# Patient Record
Sex: FEMALE | Race: WHITE | Employment: FULL TIME | URBAN - METROPOLITAN AREA
[De-identification: names, ages, dates, MRNs, and addresses within clinical notes are randomized per-mention and may not be internally consistent; named-entity substitution may affect disease eponyms.]

---

## 2021-01-04 ENCOUNTER — TELEPHONE (OUTPATIENT)
Dept: NEUROLOGY | Facility: CLINIC | Age: 41
End: 2021-01-04

## 2021-02-10 ENCOUNTER — OFFICE VISIT (OUTPATIENT)
Dept: NEUROLOGY | Facility: CLINIC | Age: 41
End: 2021-02-10
Payer: COMMERCIAL

## 2021-02-10 ENCOUNTER — TELEPHONE (OUTPATIENT)
Dept: NEUROLOGY | Facility: CLINIC | Age: 41
End: 2021-02-10

## 2021-02-10 VITALS
HEART RATE: 64 BPM | WEIGHT: 123 LBS | SYSTOLIC BLOOD PRESSURE: 120 MMHG | RESPIRATION RATE: 16 BRPM | DIASTOLIC BLOOD PRESSURE: 68 MMHG

## 2021-02-10 DIAGNOSIS — R25.1 TREMORS OF NERVOUS SYSTEM: Primary | ICD-10-CM

## 2021-02-10 PROCEDURE — 3078F DIAST BP <80 MM HG: CPT | Performed by: OTHER

## 2021-02-10 PROCEDURE — 99204 OFFICE O/P NEW MOD 45 MIN: CPT | Performed by: OTHER

## 2021-02-10 PROCEDURE — 3074F SYST BP LT 130 MM HG: CPT | Performed by: OTHER

## 2021-02-10 NOTE — H&P
Neurology H&P    Lawyer Tejeda Patient Status:  No patient class for patient encounter    2/15/1980 MRN [id number]   Location [facility], [address] [place] [address], [address] [place] [address] Attending No att. providers found   Hosp Day # 0 PCP No primary care provider use: Not Currently      Family History:  No family history on file. Mother with tremor    ROS:  10 point ROS completed and was negative, except for pertinent positive and negatives stated in subjective.     Objective/Physical Exam:    Vital Signs:  Blood these supplements contain any ingredients that would exacerbate her tremors. Plan:  1.  Tremors  - MRI brain  - Declines any medications  - Likely benign essential tremor      Joleen Sit, DO  Neurology

## 2024-10-29 ENCOUNTER — TELEPHONE (OUTPATIENT)
Facility: CLINIC | Age: 44
End: 2024-10-29

## 2024-10-29 NOTE — TELEPHONE ENCOUNTER
Patient has upcoming consult with Dr. Pace on 11/07. Records were received via fax. Records in gi bin at .

## 2024-11-07 ENCOUNTER — OFFICE VISIT (OUTPATIENT)
Facility: CLINIC | Age: 44
End: 2024-11-07
Payer: COMMERCIAL

## 2024-11-07 ENCOUNTER — LAB ENCOUNTER (OUTPATIENT)
Dept: LAB | Facility: HOSPITAL | Age: 44
End: 2024-11-07
Attending: INTERNAL MEDICINE
Payer: COMMERCIAL

## 2024-11-07 VITALS
DIASTOLIC BLOOD PRESSURE: 66 MMHG | SYSTOLIC BLOOD PRESSURE: 110 MMHG | HEIGHT: 65 IN | BODY MASS INDEX: 23.16 KG/M2 | HEART RATE: 74 BPM | WEIGHT: 139 LBS

## 2024-11-07 DIAGNOSIS — K59.04 CHRONIC IDIOPATHIC CONSTIPATION: ICD-10-CM

## 2024-11-07 DIAGNOSIS — R63.5 WEIGHT GAIN: Primary | ICD-10-CM

## 2024-11-07 DIAGNOSIS — R63.5 WEIGHT GAIN: ICD-10-CM

## 2024-11-07 DIAGNOSIS — R14.0 ABDOMINAL BLOATING: ICD-10-CM

## 2024-11-07 LAB — TSI SER-ACNC: 2.52 UIU/ML (ref 0.55–4.78)

## 2024-11-07 PROCEDURE — 3008F BODY MASS INDEX DOCD: CPT | Performed by: INTERNAL MEDICINE

## 2024-11-07 PROCEDURE — 3078F DIAST BP <80 MM HG: CPT | Performed by: INTERNAL MEDICINE

## 2024-11-07 PROCEDURE — 36415 COLL VENOUS BLD VENIPUNCTURE: CPT

## 2024-11-07 PROCEDURE — 84443 ASSAY THYROID STIM HORMONE: CPT

## 2024-11-07 PROCEDURE — 99204 OFFICE O/P NEW MOD 45 MIN: CPT | Performed by: INTERNAL MEDICINE

## 2024-11-07 PROCEDURE — 3074F SYST BP LT 130 MM HG: CPT | Performed by: INTERNAL MEDICINE

## 2024-11-07 RX ORDER — PLECANATIDE 3 MG/1
3 TABLET ORAL DAILY
COMMUNITY
Start: 2024-10-14

## 2024-11-07 RX ORDER — NORETHINDRONE 5 MG/1
5 TABLET ORAL DAILY
COMMUNITY
Start: 2024-07-15

## 2024-11-07 NOTE — H&P
[facility] - Gastroenterology                                                                                                               Reason for consult: constipation    Requesting physician or provider: No primary care provider on file.    Chief Complaint   Patient presents with    Other     Digestive problems    Constipation    Bloating       HPI:   Kadie Leyva is a 44 year old year-old female with history of tonsillectomy, who presents for constipation and bloating issues.    -dx in the past with fibromyalgia   -saw a rheumatologist but told no autoimmune disease  -PCP did a CT for bloating--->fibroid large  -Had fibroid removed in June 2024  -Endometriosis was noted also per patient  -But after surgery still had bloating issues despite fibroid surgery  -She still has constipation issues, bloating, distracting her from work  -Started Linzess prior to fibroid surgery, did not find benefit  -Now on trulance and finds it beneficial  -No family hx of CRC  -Sister with UC  -Patient denies any prior pregnancies or vaginal deliveries.  - Denies any straining, denies any stress urinary incontinence  - No diarrhea or blood in the stool  - No unusual weight loss  - Can go several days without a bowel movement  - Definitely feels better on Trulance    CT (April 2024)  IMPRESSION:    1.  Large uterine leiomyoma with mass effect, possible etiology for concern.   2.  No acute intra-abdominal abnormality       Prior endoscopies:  2018- [facility] - tortuous colon    Soc:  -no smoking  -social Etoh  -     Wt Readings from Last 6 Encounters:   11/07/24 139 lb (63 kg)   02/10/21 123 lb (55.8 kg)        History, Medications, Allergies, ROS:      Past Medical History:    BACK PAIN      Past Surgical History:   Procedure Laterality Date    Correct bunion,simple Left     Tonsillectomy        Family Hx: History reviewed. No  pertinent family history.   Social History:   Social History     Socioeconomic History    Marital status: Single   Tobacco Use    Smoking status: Never    Smokeless tobacco: Never   Vaping Use    Vaping status: Never Used   Substance and Sexual Activity    Alcohol use: Yes     Comment: occas.    Drug use: Not Currently   Other Topics Concern    Caffeine Concern Yes     Comment: rarely    Exercise Yes     Comment: little     Social Drivers of Health     Financial Resource Strain: Low Risk  (4/30/2024)    Received from [facility]    Overall Financial Resource Strain (CARDIA)     Difficulty of Paying Living Expenses: Not hard at all   Food Insecurity: No Food Insecurity (4/30/2024)    Received from [facility]    Hunger Vital Sign     Worried About Running Out of Food in the Last Year: Never true     Ran Out of Food in the Last Year: Never true   Transportation Needs: No Transportation Needs (4/30/2024)    Received from [facility]    PRAPARE - Transportation     Lack of Transportation (Medical): No     Lack of Transportation (Non-Medical): No   Physical Activity: Insufficiently Active (4/30/2024)    Received from [facility]    Exercise Vital Sign     Days of Exercise per Week: 4 days     Minutes of Exercise per Session: 20 min   Stress: No Stress Concern Present (4/30/2024)    Received from [facility]    [employer] - Occupational Stress Questionnaire     Feeling of Stress : Only a little   Social Connections: Moderately Integrated (4/30/2024)    Received from [facility]    Social Connection and Isolation Panel [NHANES]     Frequency of Communication with Friends and Family: Three times a week     Frequency of Social Gatherings with Friends and Family: Twice a week     Attends Buddhist Services: More than 4 times per year     Active Member of Clubs or Organizations: Yes      Attends Club or Organization Meetings: More than 4 times per year     Marital Status: Never    Housing Stability: Low Risk  (4/30/2024)    Received from [facility]    Housing Stability Vital Sign     Unable to Pay for Housing in the Last Year: No     Number of Times Moved in the Last Year: 0     Homeless in the Last Year: No        Medications (Active prior to today's visit):  Current Outpatient Medications   Medication Sig Dispense Refill    TRULANCE 3 MG Oral Tab Take 3 mg by mouth daily.      norethindrone 5 MG Oral Tab Take 1 tablet (5 mg total) by mouth daily.      polyethylene glycol, PEG 3350-KCl-NaBcb-NaCl-NaSulf, 236 g Oral Recon Soln Take 4,000 mL by mouth once for 1 dose. As directed by GI clinic instructions. 4000 mL 0    aspirin 325 MG Oral Tab TAKE 1 TAB BY MOUTH DAILY WITH BREAKFAST FOR 45 DAYS. BLOOD THINNING MED. START AM AFTER SURGERY.      Cholecalciferol 125 MCG (5000 UT) Oral Tab Take 5,000 Units by mouth daily.         Allergies:  Allergies[1]    ROS:   CONSTITUTIONAL:  negative for fevers, rigors  EYES:  negative for diplopia   RESPIRATORY:  negative for severe shortness of breath  CARDIOVASCULAR:  negative for crushing sub-sternal chest pain  GASTROINTESTINAL:  see HPI  GENITOURINARY:  negative for dysuria or gross hematuria  INTEGUMENT/BREAST:  SKIN:  negative for jaundice   ALLERGIC/IMMUNOLOGIC:  negative for hay fever  ENDOCRINE:  negative for cold intolerance and heat intolerance  MUSCULOSKELETAL:  negative for joint effusion/severe erythema  BEHAVIOR/PSYCH:  negative for psychotic behavior      PHYSICAL EXAM:   Blood pressure 110/66, pulse 74, height 5' 5\" (1.651 m), weight 139 lb (63 kg).    Gen- Patient appears comfortable and in no acute discomfort  HEENT: the sclera appears anicteric, oropharynx clear, mucus membranes appear moist  CV- regular rate and rhythm, the extremities are warm and well perfused   Lung- Moves air well; No labored breathing  Abdomen-  soft, non-tender exam in all quadrants without rigidity or guarding, non-distended, no abnormal bowel sounds noted, no masses are palpated  Skin- No jaundice  Ext: no cyanosis, clubbing or edema is evident.   Neuro- Alert and interactive, and gross movements of extremities normal  Psych - appropriate, non-agitated    Labs/Imaging:     Patient's pertinent labs and imaging were reviewed and discussed with patient today.      ASSESSMENT/PLAN:   Kadie Leyva is a 44 year old year-old female with history of tonsillectomy, who presents for constipation and bloating issues.    #Tortuous colon  #IBS-C  #Endometriosis  #Fibromyalgia    I had a long discussion with patient regarding her symptoms of chronic constipation for over 10 years.  She is known to have a tortuous colon on colonoscopy several years ago.  No red flag symptoms.  CT scan reviewed.  She is only partially better after large fibroid removal.  She is much improved on Trulance.  I did discuss with her that she probably has chronic constipation or IBS-C.  She will need to be on constipation medications lifelong most likely.  Will also check her thyroid testing to make sure she does not have hypothyroidism.  Washout periodically is discussed as well, risks and benefits addressed.    Recommendations:    Washout  Thyroid check  Continue trulance but can consider Linzess 290mcg/day  Squatty potty  ---------------    WASHOUT INSTRUCTIONS:  1. Pick a day you will be at home, close to a toilet.  2. Have a some juice for breakfast.   3. Around 8AM start drinking the solution prescribed (for trilyte, drink 1 cup every 15 minutes) over the course of 3-5 hours. IF having nausea/bloating, take a break for 30 minutes, walk around and then resume drinking. If vomiting, take a break for 1 hour, if symptoms improve, and you feel well, can resume drinking.  4. Goal is to finish solution or until stools turn liquid yellow.  5. For lunch you should have a liquid diet (7up,  water, gingerale, etc)  6. After prep, for dinner you can have a light dinner.  7. Resume regular meals the following day, along with laxative medications.  8. You should continue your regular medications during the washout day.         Orders This Visit:  Orders Placed This Encounter   Procedures    TSH W Reflex To Free T4       Meds This Visit:  Requested Prescriptions     Signed Prescriptions Disp Refills    polyethylene glycol, PEG 3350-KCl-NaBcb-NaCl-NaSulf, 236 g Oral Recon Soln 4000 mL 0     Sig: Take 4,000 mL by mouth once for 1 dose. As directed by GI clinic instructions.       Imaging & Referrals:  None         DAVID Pace MD  Pager: [phone]  11/7/2024        This note was partially prepared using Dragon Medical voice recognition dictation software. As a result, errors may occur. When identified, these errors have been corrected. While every attempt is made to correct errors during dictation, discrepancies may still exist.        [1]   Allergies  Allergen Reactions    Gluten Flour MYALGIA

## 2024-11-07 NOTE — PATIENT INSTRUCTIONS
Washout  Thyroid check  Continue trulance but can consider Linzess 290mcg/day  Squatty potty  ---------------    WASHOUT INSTRUCTIONS:  1. Pick a day you will be at home, close to a toilet.  2. Have a some juice for breakfast.   3. Around 8AM start drinking the solution prescribed (for trilyte, drink 1 cup every 15 minutes) over the course of 3-5 hours. IF having nausea/bloating, take a break for 30 minutes, walk around and then resume drinking. If vomiting, take a break for 1 hour, if symptoms improve, and you feel well, can resume drinking.  4. Goal is to finish solution or until stools turn liquid yellow.  5. For lunch you should have a liquid diet (7up, water, gingerale, etc)  6. After prep, for dinner you can have a light dinner.  7. Resume regular meals the following day, along with laxative medications.  8. You should continue your regular medications during the washout day.

## 2024-11-24 ENCOUNTER — PATIENT MESSAGE (OUTPATIENT)
Facility: CLINIC | Age: 44
End: 2024-11-24

## 2024-11-25 ENCOUNTER — HOSPITAL ENCOUNTER (OUTPATIENT)
Dept: CT IMAGING | Facility: HOSPITAL | Age: 44
Discharge: HOME | End: 2024-11-25
Attending: INTERNAL MEDICINE
Payer: COMMERCIAL

## 2024-11-25 DIAGNOSIS — G89.18 POST-OPERATIVE PAIN: ICD-10-CM

## 2024-11-25 DIAGNOSIS — R14.0 ABDOMINAL DISTENTION: ICD-10-CM

## 2024-11-25 LAB
CREAT BLD-MCNC: 0.9 MG/DL
EGFRCR SERPLBLD CKD-EPI 2021: 82 ML/MIN/1.73M2 (ref 60–?)

## 2024-11-25 PROCEDURE — 74177 CT ABD & PELVIS W/CONTRAST: CPT | Performed by: INTERNAL MEDICINE

## 2024-11-25 PROCEDURE — 82565 ASSAY OF CREATININE: CPT

## 2024-11-26 ENCOUNTER — TELEPHONE (OUTPATIENT)
Facility: CLINIC | Age: 44
End: 2024-11-26

## 2024-11-26 NOTE — TELEPHONE ENCOUNTER
CONCLUSION:   1. No acute finding.  Large amount of stool in the colon suggests constipation.         Left Vm for patient re: CT results showing constipation. Hx of tortuous colon on c-scope in 2018. Recommend wash out again.

## 2024-11-26 NOTE — TELEPHONE ENCOUNTER
[facility] Clinical Staff (supporting BYRON Pace MD)57 minutes ago (3:41 PM)     AO  Jay Pace,      My phone is always on silent so I miss nearly all phone calls.  I listened to your voice message just now. Thank you for the details!     I was hoping you could help me understand: if I just did 2 washouts in a row (two different Saturdays in a row) and I've been on Trulance daily, and I'm getting worse (massive bloating, back to how I was when I was not doing washouts or on meds), why would we continue doing the same thing? I'm EXTREMELY confused.      No, there's no problem with my insurance for Linzess or any other medication.      Are you suggesting I do a wash out every weekend? I need to get better!!     Bloated as always,  Kadie

## 2024-11-27 NOTE — TELEPHONE ENCOUNTER
D/w patient, reviewed CT scan.    Suspect severe IBS-C or chronic idiopathic constipation. Plan for linzess 290mcg/day. Stop Trulance.       Dr. Pace

## 2024-12-23 ENCOUNTER — PATIENT MESSAGE (OUTPATIENT)
Facility: CLINIC | Age: 44
End: 2024-12-23

## 2024-12-23 ENCOUNTER — TELEPHONE (OUTPATIENT)
Facility: CLINIC | Age: 44
End: 2024-12-23

## 2024-12-23 DIAGNOSIS — Z12.11 COLON CANCER SCREENING: Primary | ICD-10-CM

## 2024-12-23 NOTE — TELEPHONE ENCOUNTER
Scheduled for:  Colonoscopy [id number]   Provider Name:  Dr. Pace   Date:  3/26/2025  Location:    [facility]  Sedation:  Mac  Time:  1:50 (pt is aware that ENDO will call the day before to confirm arrival time)  Prep:  Golytely   Meds/Allergies Reconciled?:  Physician reviewed   Diagnosis with codes:  Colon cancer screening Z12.11  Was patient informed to call insurance with codes (Y/N):  Yes, I confirmed [employer] insurance with the patient.   Referral sent?:  Referral was sent at the time of electronic surgical scheduling.  EM or [facility] notified?:  I sent an electronic request to Endo Scheduling and received a confirmation today.   Medication Orders:  This patient verbally confirmed that she is not taking:   Iron, blood thinners, BP meds, and is not diabetic   Not latex allergy, Not PCN allergy and does not have a pacemaker  Misc Orders:     I discussed the prep instructions with the patient which she verbally understood and is aware that I will mychart  the instructions today.    Further instructions given by staff:

## 2024-12-23 NOTE — TELEPHONE ENCOUNTER
GI Scheduling Staff:     Please schedule: Colonoscopy with MAC - JAN 22nd! - please let her know this is the earliest cancellation I have, normally we book out 3-4 months.    Please send SPLIT dose Golytely bowel prep.     Diagnosis: Screening    Medication adjustments:  Day before procedure, hold: NONE  Day of procedure, hold: NONE    >>>Please inform patient if new medications are started after scheduling procedure they need to call clinic to notify us.

## 2024-12-23 NOTE — TELEPHONE ENCOUNTER
HIRA MERIDA-  Please send Golytely to the [facility] pharmacy on patients chart. Orders below.  Thank you!

## 2025-01-04 ENCOUNTER — PATIENT MESSAGE (OUTPATIENT)
Facility: CLINIC | Age: 45
End: 2025-01-04

## 2025-01-07 ENCOUNTER — PATIENT MESSAGE (OUTPATIENT)
Facility: CLINIC | Age: 45
End: 2025-01-07

## 2025-01-07 ENCOUNTER — TELEPHONE (OUTPATIENT)
Facility: CLINIC | Age: 45
End: 2025-01-07

## 2025-01-07 NOTE — TELEPHONE ENCOUNTER
Patient contacted and all prep instructions questions answered from updated Blood cell [employer] prep instructions message sent on 12/31/2024.  Patient is scheduled on 01/14/2025 with Dr. Portillo.  No further questions at this time.    Per Alissa, patient does not want March procedure canceled with Dr. Pace until she has completed her procedure on 01/14 with Dr. Portillo.  Alissa will cancel on 01/15

## 2025-01-07 NOTE — TELEPHONE ENCOUNTER
Patient called with questions about her medication and preparation for upcoming colonoscopy. Patient also sent several Patient Messages. Please call.

## 2025-01-09 NOTE — TELEPHONE ENCOUNTER
Requested Prescriptions     Pending Prescriptions Disp Refills    lubiprostone 24 MCG Oral Cap 60 capsule 0     Sig: Take 1 capsule (24 mcg total) by mouth 2 (two) times daily with meals.         LOV   11/7/2024      LR    12/24/2024    Patient comment: I'm going on vacation on the 22nd, so I will run out while I'm away. While I'm not sure yet that this medication will work long term, it seems to be doing a nice job so far!     [place]

## 2025-01-10 RX ORDER — LUBIPROSTONE 24 UG/1
24 CAPSULE ORAL 2 TIMES DAILY WITH MEALS
Qty: 60 CAPSULE | Refills: 0 | Status: SHIPPED | OUTPATIENT
Start: 2025-01-10 | End: 2025-02-09

## 2025-01-14 ENCOUNTER — HOSPITAL ENCOUNTER (OUTPATIENT)
Age: 45
Setting detail: HOSPITAL OUTPATIENT SURGERY
Discharge: HOME | End: 2025-01-14
Attending: INTERNAL MEDICINE | Admitting: INTERNAL MEDICINE
Payer: COMMERCIAL

## 2025-01-14 ENCOUNTER — ANESTHESIA (OUTPATIENT)
Dept: ENDOSCOPY | Age: 45
End: 2025-01-14
Payer: COMMERCIAL

## 2025-01-14 ENCOUNTER — ANESTHESIA EVENT (OUTPATIENT)
Dept: ENDOSCOPY | Age: 45
End: 2025-01-14
Payer: COMMERCIAL

## 2025-01-14 VITALS
HEART RATE: 67 BPM | DIASTOLIC BLOOD PRESSURE: 70 MMHG | HEIGHT: 65 IN | SYSTOLIC BLOOD PRESSURE: 102 MMHG | WEIGHT: 139 LBS | BODY MASS INDEX: 23.16 KG/M2 | OXYGEN SATURATION: 100 % | RESPIRATION RATE: 17 BRPM | TEMPERATURE: 97 F

## 2025-01-14 DIAGNOSIS — Z12.11 COLON CANCER SCREENING: ICD-10-CM

## 2025-01-14 PROBLEM — K59.00 CONSTIPATION: Status: ACTIVE | Noted: 2025-01-14

## 2025-01-14 PROCEDURE — 88305 TISSUE EXAM BY PATHOLOGIST: CPT | Performed by: INTERNAL MEDICINE

## 2025-01-14 PROCEDURE — 45385 COLONOSCOPY W/LESION REMOVAL: CPT | Performed by: INTERNAL MEDICINE

## 2025-01-14 PROCEDURE — 99070 SPECIAL SUPPLIES PHYS/QHP: CPT | Performed by: INTERNAL MEDICINE

## 2025-01-14 RX ORDER — SODIUM CHLORIDE, SODIUM LACTATE, POTASSIUM CHLORIDE, CALCIUM CHLORIDE 600; 310; 30; 20 MG/100ML; MG/100ML; MG/100ML; MG/100ML
INJECTION, SOLUTION INTRAVENOUS CONTINUOUS
Status: DISCONTINUED | OUTPATIENT
Start: 2025-01-14 | End: 2025-01-14

## 2025-01-14 RX ORDER — NALOXONE HYDROCHLORIDE 0.4 MG/ML
0.08 INJECTION, SOLUTION INTRAMUSCULAR; INTRAVENOUS; SUBCUTANEOUS ONCE AS NEEDED
OUTPATIENT
Start: 2025-01-14 | End: 2025-01-14

## 2025-01-14 RX ORDER — SODIUM CHLORIDE, SODIUM LACTATE, POTASSIUM CHLORIDE, CALCIUM CHLORIDE 600; 310; 30; 20 MG/100ML; MG/100ML; MG/100ML; MG/100ML
INJECTION, SOLUTION INTRAVENOUS CONTINUOUS
OUTPATIENT
Start: 2025-01-14

## 2025-01-14 RX ADMIN — SODIUM CHLORIDE, SODIUM LACTATE, POTASSIUM CHLORIDE, CALCIUM CHLORIDE: 600; 310; 30; 20 INJECTION, SOLUTION INTRAVENOUS at 08:20:00

## 2025-01-14 NOTE — H&P
History & Physical Examination    Patient Name: Kadie Leyva  MRN: [id number]  CSN: [id number]  YOB: 1980    Diagnosis:   Colon screening  constipation      Prescriptions Prior to Admission[1]  Current Facility-Administered Medications   Medication Dose Route Frequency    lactated ringers infusion   Intravenous Continuous       Allergies: Allergies[2]    Past Medical History:    BACK PAIN    Visual impairment     Past Surgical History:   Procedure Laterality Date    Correct bunion,simple Left     Tonsillectomy       History reviewed. No pertinent family history.  Social History     Tobacco Use    Smoking status: Never    Smokeless tobacco: Never   Substance Use Topics    Alcohol use: Yes     Comment: occas.       SYSTEM Check if Review is Normal Check if Physical Exam is Normal If not normal, please explain:   HEENT [x ] [ x]    NECK & BACK [x ] [x ]    HEART [x ] [ x]    LUNGS [x ] [ x]    ABDOMEN [x ] [x ]    UROGENITAL [ ] [ ]    EXTREMITIES [x ] [x ]    OTHER        [ x ] I have discussed the risks and benefits and alternatives with the patient/family.  They understand and agree to proceed with plan of care.  [ x ] I have reviewed the History and Physical done within the last 30 days.  Any changes noted above.    Sylvester Portillo MD  1/14/2025  8:11 AM         [1]   Medications Prior to Admission   Medication Sig Dispense Refill Last Dose/Taking    lubiprostone 24 MCG Oral Cap Take 1 capsule (24 mcg total) by mouth 2 (two) times daily with meals. 60 capsule 0 1/13/2025    norethindrone 5 MG Oral Tab Take 0.5 tablets (2.5 mg total) by mouth daily.   Taking    polyethylene glycol, PEG 3350-KCl-NaBcb-NaCl-NaSulf, 236 g Oral Recon Soln Take 4,000 mL by mouth As Directed. May substitute prescription with Golytely/Nulytely/Gavilyte and or generic equivalent, if needed. Take bowel preparation as provided by Gastroenterology office, or visit our website at  [email or web address] 4000 mL 0     [] polyethylene glycol, PEG 3350-KCl-NaBcb-NaCl-NaSulf, 236 g Oral Recon Soln Take 4,000 mL by mouth once for 1 dose. As directed by GI clinic instructions. 4000 mL 0     [] polyethylene glycol, PEG 3350-KCl-NaBcb-NaCl-NaSulf, 236 g Oral Recon Soln Take 4,000 mL by mouth once for 1 dose. As directed by GI clinic instructions. 4000 mL 0     Cholecalciferol 125 MCG (5000 UT) Oral Tab Take 5,000 Units by mouth daily.      [2]   Allergies  Allergen Reactions    Gluten Flour MYALGIA    Dairy Products OTHER (SEE COMMENTS)     myalgia

## 2025-01-14 NOTE — OPERATIVE REPORT
[facility] Endoscopy Report  Date of procedure-January 14, 2025    Preoperative Diagnosis:  -Constipation  -Colorectal cancer screening      Postoperative Diagnosis:  -Colon polyps x 2  -Small internal hemorrhoids      Procedure:    Colonoscopy       Surgeon:  Sylvester Portillo M.D.    Anesthesia:  MAC     Technique:  After informed consent, the patient was placed in the left lateral recumbent position.  Digital rectal examination revealed no palpable intraluminal abnormalities.  An Olympus variable stiffness 190 series HD colonoscope was inserted into the rectum and advanced under direct vision by following the lumen to the cecum.  The colon was examined upon withdrawal in the left lateral position.    The procedure was well tolerated without immediate complication.      Findings:  The preparation of the colon was good.  The terminal ileum was examined for 4 cm and visually normal.  The ileocecal valve was well preserved. The visualized colonic mucosa from the cecum to the anal verge was normal with an intact vascular pattern.    Sigmoid colon polyps x 2, both were sessile and ranged in size from 3 to 4 mm, both removed by cold snare technique and both sites were inspected and found to be free of bleeding.  Specimens retrieved and sent for analysis.    Small internal hemorrhoids noted on retroflexed view.    Estimated blood loss-insignificant  Specimens-see above    Impression:  -Colon polyps x 2  -Small internal hemorrhoids    Recommendations:  - Post polypectomy instructions given  - Repeat colonoscopy in 7- 10 years  - High fiber diet for diverticular disease  - Symptomatic treatment of hemorrhoids          Sylvester Portillo MD  1/14/2025  9:01 AM

## 2025-01-14 NOTE — ANESTHESIA POSTPROCEDURE EVALUATION
Patient: Kadie Leyva    Procedure Summary       Date: 01/14/25 Room / Location: [facility] ENDOSCOPY 01 / [facility]    Anesthesia Start: 0819 Anesthesia Stop:     Procedure: COLONOSCOPY Diagnosis:       Colon cancer screening      (hemorrhoids and polyps)    Surgeons: Sylvester Portillo MD Anesthesiologist: Carlos Weir MD    Anesthesia Type: MAC ASA Status: 1            Anesthesia Type: No value filed.    Vitals Value Taken Time   /54 01/14/25 0851   Temp 97.4 °F (36.3 °C) 01/14/25 0851   Pulse 79 01/14/25 0851   Resp 21 01/14/25 0851   SpO2 100 % 01/14/25 0851   Vitals shown include unfiled device data.    [facility] AN Post Evaluation:   Patient Evaluated in PACU  Patient Participation: complete - patient participated  Level of Consciousness: awake  Pain Management: adequate  Airway Patency:patent  Dental exam unchanged from preop  Yes    Cardiovascular Status: acceptable  Respiratory Status: acceptable  Postoperative Hydration acceptable      Carlos Weir MD  1/14/2025 8:52 AM

## 2025-01-14 NOTE — ANESTHESIA PREPROCEDURE EVALUATION
Anesthesia PreOp Note    HPI:     Kadie Leyva is a 44 year old female who presents for preoperative consultation requested by: Sylvester Portillo MD    Date of Surgery: 1/14/2025    Procedure(s):  COLONOSCOPY  Indication: Colon cancer screening    Relevant Problems   No relevant active problems       NPO:  Last Liquid Consumption Date: 01/14/25  Last Liquid Consumption Time: 0500  Last Solid Consumption Date: 01/13/25  Last Solid Consumption Time: 0830  Last Liquid Consumption Date: 01/14/25          History Review:  Patient Active Problem List    Diagnosis Date Noted    Weight gain 11/07/2024    Chronic idiopathic constipation 11/07/2024    Abdominal bloating 11/07/2024    Tremors of nervous system 02/10/2021       Past Medical History:    BACK PAIN    Visual impairment       Past Surgical History:   Procedure Laterality Date    Correct bunion,simple Left     Tonsillectomy         Prescriptions Prior to Admission[1]  Current Medications and Prescriptions Ordered in Epic[2]    Allergies[3]    History reviewed. No pertinent family history.  Social History     Socioeconomic History    Marital status: Single   Tobacco Use    Smoking status: Never    Smokeless tobacco: Never   Vaping Use    Vaping status: Never Used   Substance and Sexual Activity    Alcohol use: Yes     Comment: occas.    Drug use: Not Currently   Other Topics Concern    Caffeine Concern Yes     Comment: rarely    Exercise Yes     Comment: little       Available pre-op labs reviewed.             Vital Signs:  Body mass index is 23.13 kg/m².   height is 1.651 m (5' 5\") and weight is 63 kg (139 lb). Her blood pressure is 101/68 and her pulse is 63. Her respiration is 14 and oxygen saturation is 98%.   Vitals:    01/13/25 1019 01/14/25 0720   BP:  101/68   Pulse:  63   Resp:  14   SpO2:  98%   Weight: 63 kg (139 lb)    Height: 1.651 m (5' 5\")         Anesthesia Evaluation     Patient summary reviewed and Nursing notes reviewed    Airway   Dental       Pulmonary - negative ROS   Cardiovascular - negative ROS    Neuro/Psych - negative ROS     GI/Hepatic/Renal - negative ROS     Endo/Other    Abdominal                  Anesthesia Plan:   ASA:  1  Plan:   MAC      I have informed Kadie Leyva and/or legal guardian or family member of the nature of the anesthetic plan, benefits, risks including possible dental damage if relevant, major complications, and any alternative forms of anesthetic management.   All of the patient's questions were answered to the best of my ability. The patient desires the anesthetic management as planned.  Carlos Weir MD  2025 7:57 AM  Present on Admission:  **None**           [1]   Medications Prior to Admission   Medication Sig Dispense Refill Last Dose/Taking    lubiprostone 24 MCG Oral Cap Take 1 capsule (24 mcg total) by mouth 2 (two) times daily with meals. 60 capsule 0 2025    norethindrone 5 MG Oral Tab Take 0.5 tablets (2.5 mg total) by mouth daily.   Taking    polyethylene glycol, PEG 3350-KCl-NaBcb-NaCl-NaSulf, 236 g Oral Recon Soln Take 4,000 mL by mouth As Directed. May substitute prescription with Golytely/Nulytely/Gavilyte and or generic equivalent, if needed. Take bowel preparation as provided by Gastroenterology office, or visit our website at [email or web address] 4000 mL 0     [] polyethylene glycol, PEG 3350-KCl-NaBcb-NaCl-NaSulf, 236 g Oral Recon Soln Take 4,000 mL by mouth once for 1 dose. As directed by GI clinic instructions. 4000 mL 0     [] polyethylene glycol, PEG 3350-KCl-NaBcb-NaCl-NaSulf, 236 g Oral Recon Soln Take 4,000 mL by mouth once for 1 dose. As directed by GI clinic instructions. 4000 mL 0     Cholecalciferol 125 MCG (5000 UT) Oral Tab Take 5,000 Units by mouth daily.      [2]   Current Facility-Administered Medications Ordered in Epic   Medication Dose Route Frequency Provider Last Rate Last Admin    lactated ringers  infusion   Intravenous Continuous Sylvester Portillo MD         No current [facility]-ordered outpatient medications on file.   [3]   Allergies  Allergen Reactions    Gluten Flour MYALGIA    Dairy Products OTHER (SEE COMMENTS)     myalgia

## 2025-01-14 NOTE — DISCHARGE INSTRUCTIONS
Home Care Instructions for Colonoscopy with Sedation    Diet:  - Resume your regular diet as tolerated unless otherwise instructed.  - Start with light meals to minimize bloating.  - Do not drink alcohol today.    Medication:  - If you have questions about resuming your normal medications, please contact your Primary Care Physician.    Activities:  - Take it easy today. Do not return to work today.  - Do not drive today.  - Do not operate any machinery today (including kitchen equipment).    Colonoscopy:  - You may notice some rectal \"spotting\" (a little blood on the toilet tissue) for a day or two after the exam. This is normal.  - If you experience any rectal bleeding (not spotting), persistent tenderness or sharp severe abdominal pains, oral temperature over 100 degrees Fahrenheit, light-headedness or dizziness, or any other problems, contact your doctor.    **If unable to reach your doctor, please go to the [facility] Emergency Room**    - Your referring physician will receive a full report of your examination.  - If you do not hear from your doctor's office within two weeks of your biopsy, please call them for your results.    You may be able to see your laboratory results in [employer] between 4 and 7 business days.  In some cases, your physician may not have viewed the results before they are released to [employer].  If you have questions regarding your results contact the physician who ordered the test/exam by phone or via [employer] by choosing \"Ask a Medical Question.\"

## 2025-01-15 ENCOUNTER — TELEPHONE (OUTPATIENT)
Facility: CLINIC | Age: 45
End: 2025-01-15

## 2025-01-15 NOTE — TELEPHONE ENCOUNTER
Health Maintenance updated.    10 year colonoscopy recall entered into patient outreach in [facility].  Next due 1/14/2035.    Left message to call back.

## 2025-01-15 NOTE — TELEPHONE ENCOUNTER
----- Message from Sylvester Portillo sent at 1/15/2025 10:00 AM CST -----  I wanted to get back to you with your colonoscopy results.  You had 2 colon polyps removed which were benign.  I would advise a repeat colonoscopy in 10 years to make sure no new polyps are forming.      You also have internal hemorrhoids.  Please call with any questions and follow up with Dr. Pace for further management of your constipation.

## 2025-01-16 ENCOUNTER — OFFICE VISIT (OUTPATIENT)
Facility: CLINIC | Age: 45
End: 2025-01-16
Payer: COMMERCIAL

## 2025-01-16 VITALS
HEIGHT: 65 IN | HEART RATE: 61 BPM | WEIGHT: 138 LBS | DIASTOLIC BLOOD PRESSURE: 68 MMHG | BODY MASS INDEX: 22.99 KG/M2 | SYSTOLIC BLOOD PRESSURE: 107 MMHG

## 2025-01-16 DIAGNOSIS — K59.04 CHRONIC IDIOPATHIC CONSTIPATION: Primary | ICD-10-CM

## 2025-01-16 DIAGNOSIS — R14.0 ABDOMINAL BLOATING: ICD-10-CM

## 2025-01-16 PROCEDURE — 3074F SYST BP LT 130 MM HG: CPT | Performed by: INTERNAL MEDICINE

## 2025-01-16 PROCEDURE — 3008F BODY MASS INDEX DOCD: CPT | Performed by: INTERNAL MEDICINE

## 2025-01-16 PROCEDURE — 99214 OFFICE O/P EST MOD 30 MIN: CPT | Performed by: INTERNAL MEDICINE

## 2025-01-16 PROCEDURE — 3078F DIAST BP <80 MM HG: CPT | Performed by: INTERNAL MEDICINE

## 2025-01-16 NOTE — PROGRESS NOTES
[facility] - Gastroenterology                                                                                                      Clinic Follow-up Visit    Chief Complaint   Patient presents with    Follow - Up         Subjective/HPI:   Kadie Leyva is a 44 year old year-old female with history of tonsillectomy, who presents for f/u of constipation and bloating issues.     Since last visit:  -had c-scope this week, not terribly tortuous colon  -had two benign polyps removed  -feeling good after washout restart amitiza  -Patient currently denies any GI symptoms of nausea, vomiting, dyspepsia, dysphagia, hematemesis, abdominal pain, change in bowel habits, thin stools, hematochezia, or melena.  Additionally there is no weight loss and no reported history of chest pain or shortness of breath.  -last few weeks of constipation and terrible bloating    -dx in the past with fibromyalgia   -saw a rheumatologist but told no autoimmune disease  -PCP did a CT for bloating--->fibroid large  -Had fibroid removed in June 2024  -Endometriosis was noted also per patient  -But after surgery still had bloating issues despite fibroid surgery  -She still has constipation issues, bloating, distracting her from work  -Started Linzess prior to fibroid surgery, did not find benefit  -Now on trulance and finds it beneficial  -No family hx of CRC  -Sister with UC  -Patient denies any prior pregnancies or vaginal deliveries.  - Denies any straining, denies any stress urinary incontinence  - No diarrhea or blood in the stool  - No unusual weight loss  - Can go several days without a bowel movement  - Definitely feels better on Trulance     CT (April 2024)  IMPRESSION:    1.  Large uterine leiomyoma with mass effect, possible etiology for concern.   2.  No acute intra-abdominal abnormality         Prior endoscopies:  2018- [facility] - tortuous colon      Soc:  -no smoking  -social Etoh  -     Impression:  -Colon polyps x 2  -Small internal hemorrhoids    PATH: hyperplastic polyps, repeat in 10 years        Wt Readings from Last 6 Encounters:   01/16/25 138 lb (62.6 kg)   01/13/25 139 lb (63 kg)   11/07/24 139 lb (63 kg)   02/10/21 123 lb (55.8 kg)        History, Medications, Allergies, ROS:      Past Medical History:    BACK PAIN    Visual impairment      Past Surgical History:   Procedure Laterality Date    Colonoscopy N/A 1/14/2025    Procedure: COLONOSCOPY;  Surgeon: Sylvester Portillo MD;  Location: [facility] ENDO    Correct bunion,simple Left     Tonsillectomy        History reviewed. No pertinent family history.   Social History:   Social History     Socioeconomic History    Marital status: Single   Tobacco Use    Smoking status: Never    Smokeless tobacco: Never   Vaping Use    Vaping status: Never Used   Substance and Sexual Activity    Alcohol use: Yes     Comment: occas.    Drug use: Not Currently   Other Topics Concern    Caffeine Concern Yes     Comment: rarely    Exercise Yes     Comment: little     Social Drivers of Health     Financial Resource Strain: Low Risk  (12/1/2024)    Received from [facility]    Overall Financial Resource Strain (CARDIA)     Difficulty of Paying Living Expenses: Not hard at all   Food Insecurity: No Food Insecurity (12/1/2024)    Received from [facility]    Hunger Vital Sign     Worried About Running Out of Food in the Last Year: Never true     Ran Out of Food in the Last Year: Never true   Transportation Needs: No Transportation Needs (12/1/2024)    Received from [facility]    PRAPARE - Transportation     Lack of Transportation (Medical): No     Lack of Transportation (Non-Medical): No   Physical Activity: Insufficiently Active (12/1/2024)    Received from [facility]    Exercise Vital Sign     Days of Exercise per Week: 3 days      Minutes of Exercise per Session: 20 min   Stress: No Stress Concern Present (12/1/2024)    Received from [facility]    [place] [employer] - Occupational Stress Questionnaire     Feeling of Stress : Not at all   Social Connections: Moderately Integrated (12/1/2024)    Received from [facility]    Social Connection and Isolation Panel [NHANES]     Frequency of Communication with Friends and Family: Twice a week     Frequency of Social Gatherings with Friends and Family: Once a week     Attends Scientologist Services: More than 4 times per year     Active Member of Clubs or Organizations: Yes     Attends Club or Organization Meetings: More than 4 times per year     Marital Status: Never    Housing Stability: Low Risk  (12/1/2024)    Received from [facility]    Housing Stability Vital Sign     Unable to Pay for Housing in the Last Year: No     Number of Times Moved in the Last Year: 0     Homeless in the Last Year: No        Medications (Active prior to today's visit):  Current Outpatient Medications   Medication Sig Dispense Refill    polyethylene glycol, PEG 3350-KCl-NaBcb-NaCl-NaSulf, 236 g Oral Recon Soln Take 4,000 mL by mouth once for 1 dose. As directed by GI clinic instructions. 4000 mL 0    lubiprostone 24 MCG Oral Cap Take 1 capsule (24 mcg total) by mouth 2 (two) times daily with meals. 60 capsule 0    polyethylene glycol, PEG 3350-KCl-NaBcb-NaCl-NaSulf, 236 g Oral Recon Soln Take 4,000 mL by mouth As Directed. May substitute prescription with Golytely/Nulytely/Gavilyte and or generic equivalent, if needed. Take bowel preparation as provided by Gastroenterology office, or visit our website at [email or web address] 4000 mL 0    norethindrone 5 MG Oral Tab Take 0.5 tablets (2.5 mg total) by mouth daily.      Cholecalciferol 125 MCG (5000 UT) Oral Tab Take 5,000 Units by mouth  daily.         Allergies:  Allergies[1]    ROS:   CONSTITUTIONAL:  negative for fevers, chills  EYES:  negative for change in vision  RESPIRATORY:  negative for  shortness of breath  CARDIOVASCULAR:  negative for  chest pain  GASTROINTESTINAL:  see HPI  GENITOURINARY:  negative for dysuria  INTEGUMENT/BREAST:  SKIN:  negative for  rash  ALLERGIC/IMMUNOLOGIC:  negative for hay fever  ENDOCRINE:  negative for cold intolerance and heat intolerance  MUSCULOSKELETAL:  negative for  joint stiffness and joint swelling  BEHAVIOR/PSYCH:  negative for depressed mood    PHYSICAL EXAM:   Blood pressure 107/68, pulse 61, height 5' 5\" (1.651 m), weight 138 lb (62.6 kg), last menstrual period 09/01/2024, not currently breastfeeding.    Gen- Patient appears comfortable and in no acute discomfort  HEENT: the sclera appears anicteric, oropharynx clear, mucus membranes appear moist  CV- regular rate and rhythm, the extremities are warm and well perfused   Lung- Moves air well; No labored breathing  Abdomen- soft, non-tender exam in all quadrants without rigidity or guarding, non-distended, no abnormal bowel sounds noted, no masses are palpated  Skin- No jaundice  Ext: no cyanosis, clubbing or edema is evident.   Neuro- Alert and oriented x4, and patient is having movement of all 4 extremities   Psych - appropriate, non-agitated    Labs/Imaging:     Patient's labs and imaging were reviewed and discussed with patient today.     .  ASSESSMENT/PLAN:   Kadie Leyva is a 44 year old year-old female with history of tonsillectomy, who presents for f/u of constipation and bloating issues.    #Colon polyps - benign hyperplastic polyps of sigmoid, repeat CLN In 10 years (2035)    #Tortuous colon -not noted on c-scope, but prior GI noted this as well as mildly tortuous on Ct imaging.    #Bloating/constipation - CIC vs IBS-C, we dicussed this at length today including pathophysiology. Tried linzess and trulance, not effective. On amitiza which  she can continue. Can consider motegrity, pelvic floor PT and or referral to academic centerr if not improving.    Washout when needed - use when very constipated  Stay on Amitiza      Orders This Visit:  No orders of the defined types were placed in this encounter.      Meds This Visit:  Requested Prescriptions     Signed Prescriptions Disp Refills    polyethylene glycol, PEG 3350-KCl-NaBcb-NaCl-NaSulf, 236 g Oral Recon Soln 4000 mL 0     Sig: Take 4,000 mL by mouth once for 1 dose. As directed by GI clinic instructions.       Imaging & Referrals:  None     1/16/2025    DAVID Pace MD  Pager: [phone]        This note was partially prepared using Dragon [employer] voice recognition dictation software. As a result, errors may occur. When identified, these errors have been corrected. While every attempt is made to correct errors during dictation, discrepancies may still exist.        [1]   Allergies  Allergen Reactions    Gluten Flour MYALGIA    Dairy Products OTHER (SEE COMMENTS)     myalgia

## 2025-02-07 ENCOUNTER — TELEPHONE (OUTPATIENT)
Facility: CLINIC | Age: 45
End: 2025-02-07

## 2025-02-07 RX ORDER — LUBIPROSTONE 24 UG/1
24 CAPSULE ORAL 2 TIMES DAILY WITH MEALS
Qty: 60 CAPSULE | Refills: 0 | Status: SHIPPED | OUTPATIENT
Start: 2025-02-07 | End: 2025-03-09

## 2025-02-07 NOTE — TELEPHONE ENCOUNTER
Current Outpatient Medications   Medication Sig Dispense Refill    lubiprostone 24 MCG Oral Cap Take 1 capsule (24 mcg total) by mouth 2 (two) times daily with meals. 60 capsule 0

## 2025-03-06 ENCOUNTER — PATIENT MESSAGE (OUTPATIENT)
Facility: CLINIC | Age: 45
End: 2025-03-06

## 2025-03-07 RX ORDER — SODIUM, POTASSIUM,MAG SULFATES 17.5-3.13G
SOLUTION, RECONSTITUTED, ORAL ORAL
Qty: 1 EACH | Refills: 0 | Status: SHIPPED | OUTPATIENT
Start: 2025-03-07

## 2025-03-21 RX ORDER — LUBIPROSTONE 24 UG/1
24 CAPSULE ORAL 2 TIMES DAILY WITH MEALS
Qty: 180 CAPSULE | Refills: 0 | Status: SHIPPED | OUTPATIENT
Start: 2025-03-21 | End: 2025-06-19

## 2025-05-09 ENCOUNTER — OFFICE VISIT (OUTPATIENT)
Facility: CLINIC | Age: 45
End: 2025-05-09
Payer: COMMERCIAL

## 2025-05-09 VITALS
HEART RATE: 69 BPM | HEIGHT: 65 IN | SYSTOLIC BLOOD PRESSURE: 102 MMHG | DIASTOLIC BLOOD PRESSURE: 65 MMHG | BODY MASS INDEX: 23.16 KG/M2 | WEIGHT: 139 LBS

## 2025-05-09 DIAGNOSIS — K59.09 CHRONIC CONSTIPATION: ICD-10-CM

## 2025-05-09 DIAGNOSIS — M62.89 PELVIC FLOOR DYSFUNCTION: Primary | ICD-10-CM

## 2025-05-09 PROCEDURE — 99214 OFFICE O/P EST MOD 30 MIN: CPT | Performed by: INTERNAL MEDICINE

## 2025-05-09 PROCEDURE — 3074F SYST BP LT 130 MM HG: CPT | Performed by: INTERNAL MEDICINE

## 2025-05-09 PROCEDURE — 3008F BODY MASS INDEX DOCD: CPT | Performed by: INTERNAL MEDICINE

## 2025-05-09 PROCEDURE — 3078F DIAST BP <80 MM HG: CPT | Performed by: INTERNAL MEDICINE

## 2025-05-09 RX ORDER — PRUCALOPRIDE 1 MG/1
1 TABLET ORAL DAILY
Qty: 30 TABLET | Refills: 0 | Status: SHIPPED | OUTPATIENT
Start: 2025-05-09 | End: 2025-06-08

## 2025-05-09 RX ORDER — SODIUM, POTASSIUM,MAG SULFATES 17.5-3.13G
SOLUTION, RECONSTITUTED, ORAL ORAL
Qty: 1 EACH | Refills: 0 | Status: SHIPPED | OUTPATIENT
Start: 2025-05-09

## 2025-05-09 RX ORDER — CHLORAL HYDRATE 500 MG
1000 CAPSULE ORAL DAILY
COMMUNITY

## 2025-05-09 NOTE — PATIENT INSTRUCTIONS
Stop lubiprostone  Start motegrity 1mg/day  Continue the smoothies that are helping  Use suprep washout when very constipated  Pelvic floor P

## 2025-05-13 PROBLEM — M62.89 PELVIC FLOOR DYSFUNCTION: Status: ACTIVE | Noted: 2025-05-13

## 2025-05-13 PROBLEM — K59.09 CHRONIC CONSTIPATION: Status: ACTIVE | Noted: 2025-01-14

## 2025-05-13 NOTE — PROGRESS NOTES
[facility] - Gastroenterology                                                                                                      Clinic Follow-up Visit    Chief Complaint   Patient presents with    Follow - Up    Constipation         Subjective/HPI:   Kadie Leyva is a 45 year old year-old female with history of tonsillectomy, who presents for f/u of constipation and bloating issues.     Since last visit:  -continues to have constipation issues despite lubiprostone, taking regularly  -no melena or hematochezia  -no cp/sob  -no vomiting  -very bloated at times  -also lot of stress at work, probably leaving her current job  -no dysphagia  -no odynophagia  -moves bowels every 2-3 days      PRIOR VISIT:    -dx in the past with fibromyalgia   -saw a rheumatologist but told no autoimmune disease  -PCP did a CT for bloating--->fibroid large  -Had fibroid removed in June 2024  -Endometriosis was noted also per patient  -But after surgery still had bloating issues despite fibroid surgery  -She still has constipation issues, bloating, distracting her from work  -Started Linzess prior to fibroid surgery, did not find benefit  -Now on trulance and finds it beneficial  -No family hx of CRC  -Sister with UC  -Patient denies any prior pregnancies or vaginal deliveries.  - Denies any straining, denies any stress urinary incontinence  - No diarrhea or blood in the stool  - No unusual weight loss  - Can go several days without a bowel movement  - Definitely feels better on Trulance     CT (April 2024)  IMPRESSION:    1.  Large uterine leiomyoma with mass effect, possible etiology for concern.   2.  No acute intra-abdominal abnormality         Prior endoscopies:  2018- [facility] - tortuous colon     Soc:  -no smoking  -social Etoh  -     Impression:  -Colon polyps x 2  -Small internal hemorrhoids    PATH: hyperplastic polyps,  repeat in 10 years        Wt Readings from Last 6 Encounters:   05/09/25 139 lb (63 kg)   01/16/25 138 lb (62.6 kg)   01/13/25 139 lb (63 kg)   11/07/24 139 lb (63 kg)   02/10/21 123 lb (55.8 kg)        History, Medications, Allergies, ROS:      Past Medical History:    BACK PAIN    Visual impairment      Past Surgical History:   Procedure Laterality Date    Colonoscopy N/A 1/14/2025    Procedure: COLONOSCOPY;  Surgeon: Sylvester Portillo MD;  Location: [facility] ENDO    Correct bunion,simple Left     Tonsillectomy        Family History   Problem Relation Age of Onset    Colon Cancer Neg       Social History:   Social History     Socioeconomic History    Marital status: Single   Tobacco Use    Smoking status: Never    Smokeless tobacco: Never   Vaping Use    Vaping status: Never Used   Substance and Sexual Activity    Alcohol use: Yes     Comment: occas.    Drug use: Not Currently   Other Topics Concern    Caffeine Concern Yes     Comment: rarely    Exercise Yes     Comment: little     Social Drivers of Health     Food Insecurity: No Food Insecurity (12/1/2024)    Received from [facility]    Hunger Vital Sign     Worried About Running Out of Food in the Last Year: Never true     Ran Out of Food in the Last Year: Never true   Transportation Needs: No Transportation Needs (12/1/2024)    Received from [facility]    PRAPARE - Transportation     Lack of Transportation (Medical): No     Lack of Transportation (Non-Medical): No   Stress: No Stress Concern Present (12/1/2024)    Received from [facility]    [place] [place] [employer] - Occupational Stress Questionnaire     Feeling of Stress : Not at all   Housing Stability: Low Risk  (12/1/2024)    Received from [facility]    Housing Stability Vital Sign     Unable to Pay for Housing in the Last Year: No     Number of Times Moved in the Last Year: 0     Homeless in the Last Year: No         Medications (Active prior to today's visit):  Current Outpatient Medications   Medication Sig Dispense Refill    Prucalopride Succinate (MOTEGRITY) 1 MG Oral Tab Take 1 mg by mouth daily. 30 tablet 0    Na Sulfate-K Sulfate-Mg Sulf (SUPREP BOWEL PREP KIT) 17.5-3.13-1.6 GM/177ML Oral Solution Take prep as directed by gastro office. May substitute with Trilyte/generic equivalent if needed. 1 each 0    norethindrone 5 MG Oral Tab Take 0.5 tablets (2.5 mg total) by mouth daily.      omega-3 fatty acids 1000 MG Oral Cap Take 1,000 mg by mouth daily.      Cholecalciferol 125 MCG (5000 UT) Oral Tab Take 5,000 Units by mouth daily.         Allergies:  Allergies[1]    ROS:   CONSTITUTIONAL:  negative for fevers, chills  EYES:  negative for change in vision  RESPIRATORY:  negative for  shortness of breath  CARDIOVASCULAR:  negative for  chest pain  GASTROINTESTINAL:  see HPI  GENITOURINARY:  negative for dysuria  INTEGUMENT/BREAST:  SKIN:  negative for  rash  ALLERGIC/IMMUNOLOGIC:  negative for hay fever  ENDOCRINE:  negative for cold intolerance and heat intolerance  MUSCULOSKELETAL:  negative for  joint stiffness and joint swelling  BEHAVIOR/PSYCH:  negative for depressed mood    PHYSICAL EXAM:   Blood pressure 102/65, pulse 69, height 5' 5\" (1.651 m), weight 139 lb (63 kg), last menstrual period 09/01/2024, not currently breastfeeding.    Gen- Patient appears comfortable and in no acute discomfort  HEENT: the sclera appears anicteric, oropharynx clear, mucus membranes appear moist  CV- regular rate and rhythm, the extremities are warm and well perfused   Lung- Moves air well; No labored breathing  Abdomen- soft, non-tender exam in all quadrants without rigidity or guarding, non-distended, no abnormal bowel sounds noted, no masses are palpated  Skin- No jaundice  Ext: no cyanosis, clubbing or edema is evident.   Neuro- Alert and oriented x4, and patient is having movement of all 4 extremities   Psych - appropriate,  non-agitated    Labs/Imaging:     Patient's labs and imaging were reviewed and discussed with patient today.     .  ASSESSMENT/PLAN:   Kadie Leyva is a 45 year old year-old female with history of tonsillectomy, who presents for f/u of constipation and bloating issues.    #Colon polyps - benign hyperplastic polyps of sigmoid, repeat CLN In 10 years (2035)    #Tortuous colon -not noted on c-scope, but prior GI noted this as well as mildly tortuous on Ct imaging.    #Bloating/constipation - CIC vs IBS-C, we dicussed this at length today including pathophysiology. Tried linzess and trulance, not effective. Amitiza not effective either, goes to toilet every other day.    Stop lubiprostone  Start motegrity 1mg/day  Continue the smoothies that are helping  Use suprep washout when very constipated  Pelvic floor P                                                            Orders This Visit:  No orders of the defined types were placed in this encounter.      Meds This Visit:  Requested Prescriptions     Signed Prescriptions Disp Refills    Prucalopride Succinate (MOTEGRITY) 1 MG Oral Tab 30 tablet 0     Sig: Take 1 mg by mouth daily.    Na Sulfate-K Sulfate-Mg Sulf (SUPREP BOWEL PREP KIT) 17.5-3.13-1.6 GM/177ML Oral Solution 1 each 0     Sig: Take prep as directed by gastro office. May substitute with Trilyte/generic equivalent if needed.       Imaging & Referrals:  PELVIC FLOOR THERAPY - INTERNAL     1/16/2025    DAVID Pace MD  Pager: [phone]        This note was partially prepared using Dragon Medical voice recognition dictation software. As a result, errors may occur. When identified, these errors have been corrected. While every attempt is made to correct errors during dictation, discrepancies may still exist.          [1]   Allergies  Allergen Reactions    Gluten Flour MYALGIA    Dairy Products OTHER (SEE COMMENTS)     myalgia

## 2025-05-14 ENCOUNTER — TELEPHONE (OUTPATIENT)
Facility: CLINIC | Age: 45
End: 2025-05-14

## 2025-05-14 NOTE — TELEPHONE ENCOUNTER
Patient has questions regarding instructions for Bowel preparation prescription as she is not using it for colonoscopy purposes.  Patient wants to know if she eat?  What to eat?  How much to eat?  Also wanted to know if she is to take all of the prescription in one day?  Also wanted to know how much time in between to take the preparation?  Please call - ok to leave detailed message.  Thank you.

## 2025-06-06 ENCOUNTER — PATIENT MESSAGE (OUTPATIENT)
Facility: CLINIC | Age: 45
End: 2025-06-06

## 2025-06-09 ENCOUNTER — TELEPHONE (OUTPATIENT)
Facility: CLINIC | Age: 45
End: 2025-06-09

## 2025-06-09 RX ORDER — PRUCALOPRIDE 2 MG/1
2 TABLET ORAL DAILY
Qty: 30 TABLET | Refills: 2 | Status: SHIPPED | OUTPATIENT
Start: 2025-06-09 | End: 2025-07-09

## 2025-06-10 ENCOUNTER — TELEPHONE (OUTPATIENT)
Dept: PHYSICAL THERAPY | Facility: HOSPITAL | Age: 45
End: 2025-06-10

## 2025-06-11 ENCOUNTER — OFFICE VISIT (OUTPATIENT)
Dept: PHYSICAL THERAPY | Age: 45
End: 2025-06-11
Attending: INTERNAL MEDICINE
Payer: COMMERCIAL

## 2025-06-11 DIAGNOSIS — M62.89 PELVIC FLOOR DYSFUNCTION: Primary | ICD-10-CM

## 2025-06-11 DIAGNOSIS — K59.09 CHRONIC CONSTIPATION: ICD-10-CM

## 2025-06-11 PROCEDURE — 97112 NEUROMUSCULAR REEDUCATION: CPT

## 2025-06-11 PROCEDURE — 97161 PT EVAL LOW COMPLEX 20 MIN: CPT

## 2025-06-11 PROCEDURE — 97530 THERAPEUTIC ACTIVITIES: CPT

## 2025-06-11 NOTE — PROGRESS NOTES
MUSCULOSKELETAL AND PELVIC FLOOR EVALUATION:     Diagnosis:   Chronic constipation (K59.09), Pelvic floor dysfunction (M62.89) Patient:  Kadie Leyva (45 year old, female)        Referring Provider: BYRON Pace  Today's Date   6/11/2025    Precautions:  None   Date of Evaluation: 06/11/25  Next MD visit: No data recorded  Date of Surgery: n/a     PATIENT SUMMARY     Summary of chief complaints: constipation, severe abdominal bloating  History of current condition: Pt has had chronic constipation for 12 years, progressively worse, past 5 years has had significant abdominal bloating, so much that she has difficulty focusing at work. Also had sensation of skin pulling, appearance of proturding abdomen. Has tried 4 different prescription meds, miralax does not work. Often meds work at first, then stop working. Tried smoothie, worked at first, then stopped. That seems to be the issue, things work initially then stop working. Dr. Pace wants her to work with PFPT, however there is the possibility of being referred to further medical specialist as needed.   Pain level: current 1 /10, at best 1 /10, at worst 4 /10  Description of symptoms: pressure   Occupation: , not currently working   Leisure activities/Hobbies: walking/hiking occasional, 3 days a week HIIT, yoga 2 days a week, also in PT for other back / joint pain   Prior level of function: no issue prior to 12 years ago  Current limitations: decreased appetite, difficulty focusing at work due to abdominal pressure, incomplete bowel movements, occasional straining with BM the last few months  Pt goals: see if PFPT can improve constipation  Pregnant Now: No   OB History   No obstetric history on file.     PFDI 20    Scores   POPDI 6:   0 37.5   CRAD 8:   0 31.25   DONOVAN 6:   0 41.67   Summary:   0 110.42      Outpatient Therapy Pelvic Health Intake       Question 6/10/2025  8:00 PM CDT - Filed by Patient    Do you usually experience pressure in the lower  abdomen? Somewhat    Do you usually experience heaviness or dullness in the pelvic area? Somewhat    Do you usually have a bulge or something falling out that you can see or feel in your vaginal area? Not at all    Do you ever have to push on the vagina or around the rectum to have or complete a bowel movement? Not at all    Do you usually experience a feeling of incomplete bladder emptying? Somewhat    Do you ever have to push up on a bulge in the vaginal area with your fingers to start or complete urination? Not at all    Please provide details on the following urinary history / complaints     Frequency of urination: # of times/day 8    Frequency of urination: # of times/night 2-3    When you have the urge to urinate, how long can you delay before you have to go to the toilet? (# of minutes or hours) 10 min    Do you have a history of urinary tract infections: Yes    Do you have a history of urine loss (select all that apply) as a child    How many times a day does leakage occur? 0    If you have leakage, what type(s) of protective device(s) do you use? (Select all that apply)       On average, how many pads do you use each day? 0    Do you soak the pad fully?       Do you change the pad each time it is wet?       Do you experience any of these symptoms? (Select all that apply)       Do you usually experience frequent urination? Moderately    Do you usually experience urine leakage associated with a feeling of urgency, that is, a strong sensation of needing to go to the bathroom? Not at all    Do you usually experience urine leakage related to coughing, sneezing, or laughing? Not at all    Do you usually experience small amounts of urine leakage (that is, drops)? Not at all    Do you usually experience difficulty empyting your bladder? Somewhat    Do you usually experience pain or discomfort in the lower abdomen or genital region? Somewhat    Have you ever had any of the following treatment:     Have you ever done  exercises to control urine loss? If so, for how long? NA    Has your doctor ever prescribed any medication for urine loss? No    Have you had any surgical procedures to treat urine loss? No    Please provide details on the following bowel history / complaints.     How many bowel movements do you have per day? 1    How many bowel movements do you have per night? 0    Do you experience diarrhea? If yes, how often? No    Do you feel you need to strain too hard to have a bowel movement? Not at all    Do you feel you have not completely emptied your bowels at the end of a bowel movement? Quite a bit    Do you usually lose stool beyond your control if your stool is well formed? Not present    Do you usually lose stool beyond your control if your stool is loose? Not present    Do you usually lose gas from the rectum beyond your control? Somewhat    Do you usually have pain when you pass your stool? Not at all    Do you experience strong sense of urgency and have to rush to bathroom for bowel movement? Not at all    Does part of bowel ever pass through rectum and bulge outside during/after bowel movement? Not at all    Please provide details on your daily fluid/food intake.     On average, what is your daily fluid intake (1 glass=8ounces)? (# of glasses per day) 8    How many are caffeinated? (# of glasses per day) 0    Do you restrict fluids because of incontenence (leakage)? No    Do you include fiber in your diet (fruits, vegetables, bran, etc.)? Yes    Psychosocial information     Do you live alone? Yes    Which recreational activities do you participate in if any? Work out 5 times a week, hike or walk sometimes    Have you had to restrict your activities due to your pelvic health concerns? No    On a scale of 0 (no impairments) to 10 (severe impairments), what are your feelings about your urinary or bowel incontinence or pelvic pain? 6    Do you have pain with intercourse?       Have you had any changes in intimate  relationships/sexual function due to your symptoms?        Your personal safety is of utmost importance to us at [facility], please answer the following questions:     Are you being hurt, frightened, demeaned, or taken advantage of by anyone at your home or in your life?  No    Have you recently had thoughts of hurting yourself? No    Have you tried to hurt yourself in the past?  No    Pelvic Organ Prolapse Distress Inventory 6 Score (range: 0 - 100) 37.5    Colorectal-Anal Distress Inventory 8 Score (range: 0 - 100) 31.25    Urinary Distress Inventory 6 Score (range: 0 - 100) 41.67    PFDI Summary Score (range: 0 - 300) 110.42          Bowel health:  Daily, but incomplete  BSS 2-4  Gets fiber/fat  Decreased appetite overall    Sexual Health Status   Sexual intercourse status: not currently active    Past medical history was reviewed with Kadie.  Significant findings include: L3/4 herniated disk 2008, fibroid removal last June  Imaging/Tests: colonoscopy early 2025   Kadie  has a past medical history of BACK PAIN and Visual impairment.  She  has a past surgical history that includes tonsillectomy; correct bunion,simple (Left); and colonoscopy (N/A, 1/14/2025).    ASSESSMENT  Kadie presents to physical therapy evaluation with primary c/o constipation, severe abdominal bloating. The results of the objective tests and measures show symptoms with severe distress per PFDI20 score, mild BETH at level of umbilicus. Functional deficits include but are not limited to decreased appetite, difficulty focusing at work due to abdominal pressure, incomplete bowel movements, occasional straining with BM the last few months. Signs and symptoms are consistent with diagnosis of Chronic constipation (K59.09), Pelvic floor dysfunction (M62.89). Pt and PT discussed evaluation findings, pathology, POC and HEP.  Pt voiced understanding and performs HEP correctly without reported pain. Skilled Physical Therapy is medically  necessary to address the above impairments and reach functional goals.    OBJECTIVE:      Musculoskeletal  Posture: Posture: WNL   Pelvic Alignment: WNL   Abdominal Wall Integrity: grossly intact   Diastasis Recti: umbilicus positive, above umbilicus negative, below umbilicus negative (2 finger width at level of umbilicus)           Informed consent for internal pelvic evaluation given:-- (defer to future session)     ROM and Strength  (* denotes performed with pain)  Trunk ROM MMT (-/5)     Flex WNL       Ext WNL      R L R L     Side bend WNL WNL         Rotation WNL WNL       ,   Hip   ROM MMT (-/5)    R L R L     Flex (L2)             Ext              Abd     4+ 4+     ER     4+ 4+     IR     4+ 4+        Flexibility:    LE Flexibility R L     Hip Flexor         Hamstrings WNL WNL     ITB         Piriformis         Quads         Gastroc-soleus         Adductors         Balance and Functional Mobility  Gait: pt ambulates on level ground with normal mechanics.  SLS EO: R 10 SEC    L 10 SEC        Today's Treatment and Response:   Pt education was provided on exam findings, treatment diagnosis, treatment plan, expectations, and prognosis.    Today's Treatment       6/11/2025   Pelvic Treatment   Neuro Re-Education Worked on diaphragmatic breathing in various positions   Therapeutic Activity Educated on pelvic floor and pelvic organ anatomy and function (with use of model). Discussed causes of constipation and strategies for mitigating symptoms including increased fiber, water, and exercise. Discussed use of squatty potty to optimize PFM relaxation and promote bowel emptying without straining. Discussed use of abdominal massage to improve stool transit time.     Neuro Re-Educ Minutes 10   Therapeutic Activity Minutes 15   Evaluation Minutes 20   Total Time Of Timed Procedures 25   Total Time Of Service-Based Procedures 20   Total Treatment Time 45   HEP Access Code: [email or web address]  URL:  [email or web address]  Date: 06/11/2025  Prepared by: [employer]    Program Notes  breathing sequence: prone breathing x10, supine belly breathing x10, supine lateral rib breathing x10, sidelying breathing arm overhead x10    Exercises  - Prone Diaphragmatic Breathing  - 1 x daily - 7 x weekly - 1 sets - 10 reps  - Supine Diaphragmatic Breathing  - 1 x daily - 7 x weekly - 1 sets - 10 reps  - Sidelying Diaphragmatic Breathing  - 1 x daily - 7 x weekly - 1 sets - 10 reps    Patient Education  - Get To Know Your Pelvic Floor- Female  - Bowel Emptying Techniques  - Abdominal Massage for Constipation        Patient was instructed in and issued a HEP for: Access Code: [email or web address]  URL: [email or web address]  Date: 06/11/2025  Prepared by: [employer]    Program Notes  breathing sequence: prone breathing x10, supine belly breathing x10, supine lateral rib breathing x10, sidelying breathing arm overhead x10    Exercises  - Prone Diaphragmatic Breathing  - 1 x daily - 7 x weekly - 1 sets - 10 reps  - Supine Diaphragmatic Breathing  - 1 x daily - 7 x weekly - 1 sets - 10 reps  - Sidelying Diaphragmatic Breathing  - 1 x daily - 7 x weekly - 1 sets - 10 reps    Patient Education  - Get To Know Your Pelvic Floor- Female  - Bowel Emptying Techniques  - Abdominal Massage for Constipation    Charges:  PT EVAL: Low Complexity, 1 NMR, 1 TA  In agreement with evaluation findings and clinical rationale, this evaluation involved LOW COMPLEXITY decision making due to no personal factors/comorbidities, 1-2 body structures involved/activity limitations, and stable symptoms as documented in the evaluation.                                                                       PLAN OF CARE:      Goals: (to be met in 10 visits)    Not Met Progress Toward Partially Met Met   Patient will report consistent grade 4 on BSS for improved bowel health and ease with stool evacuation. [] [] [] []   Patient will report  consumption of at least 25 grams of daily fiber and 64 ounces of water for improved stool consistency. [] [] [] []   Patient will demonstrate optimal voiding mechanics and breath regulation with bowel movements for improved bowel emptying without straining. [] [] [] []   Patient will report increased BM frequency to at least 1x every other day indicating improved stool motility and overall bowel health. [] [] [] []   Patient will report adherence to HEP for continued exercise benefits following cessation of PT. [] [] [] []       Frequency / Duration: Patient will be seen 1x/week or a total of 10  visits over a 90 day period. Treatment will include: Manual Therapy; Neuromuscular Re-education; Self-Care Home Management; Therapeutic Activities; Therapeutic Exercise; Home Exercise Program instruction; Patient/Family Education    Education or treatment limitation: None   Rehab Potential: good     Patient/Family/Caregiver was advised of these findings, precautions, and treatment options and has agreed to actively participate in planning and for this course of care.    Thank you for your referral. Please co-sign or sign and return this letter via fax as soon as possible to [phone]. If you have any questions, please contact me at Dept: [phone]    Sincerely,  Electronically signed by therapist: Abby Anna PT  Physician's certification required: Yes  I certify the need for these services furnished under this plan of treatment and while under my care.    X___________________________________________________ Date____________________    Certification From: 6/11/2025  To: 9/9/2025

## 2025-06-18 ENCOUNTER — OFFICE VISIT (OUTPATIENT)
Dept: PHYSICAL THERAPY | Age: 45
End: 2025-06-18
Attending: INTERNAL MEDICINE
Payer: COMMERCIAL

## 2025-06-18 PROCEDURE — 97140 MANUAL THERAPY 1/> REGIONS: CPT

## 2025-06-18 PROCEDURE — 97530 THERAPEUTIC ACTIVITIES: CPT

## 2025-06-18 NOTE — PROGRESS NOTES
Patient: Kadie Leyva (45 year old, female) Referring Provider:  Insurance:   Diagnosis: Chronic constipation (K59.09), Pelvic floor dysfunction (M62.89) BYRON Pace  BCBS OUT OF STATE   Date of Surgery: n/a Next MD visit:  N/A   Precautions:  None No data recorded Referral Information:    Date of Evaluation: Req: 0, Auth: 0, Exp:     06/11/25 POC Auth Visits:  10      Today's Date   6/18/2025    Subjective  Pt reports doing bowel/fiber diary. Started increased dose of medication, helped the first day, but not sure how much it is now. Not bloated today.       Pain: 0/10     Objective  see flowsheet below, pt declined internal PFM at this time       Bowel/fiber diary: fiber intake varied from 7-25g/day, inconsistent     Assessment  Pt will benefit from trial of more consistent fiber intake. Overall daily fiber vaires significantly. Pt with audible bowel sounds during abdominal MFR and bowel massage. Pt would benefit from trial for daily bowel massage as part of HEP.    Goals (to be met in 10 visits)      Not Met Progress Toward Partially Met Met   Patient will report consistent grade 4 on BSS for improved bowel health and ease with stool evacuation. [] [x] [] []   Patient will report consumption of at least 25 grams of daily fiber and 64 ounces of water for improved stool consistency. [] [x] [] []   Patient will demonstrate optimal voiding mechanics and breath regulation with bowel movements for improved bowel emptying without straining. [] [] [] []   Patient will report increased BM frequency to at least 1x every other day indicating improved stool motility and overall bowel health. [] [x] [] []   Patient will report adherence to HEP for continued exercise benefits following cessation of PT. [] [x] [] []           Plan  bowel/abdominal massage    Treatment Last 4 Visits  Treatment Day: 2       6/11/2025 6/18/2025   Pelvic Treatment   Neuro Re-Education Worked on diaphragmatic breathing in various positions     Manual Therapy  Abdominal MFR  Bowel massage   Therapeutic Activity Educated on pelvic floor and pelvic organ anatomy and function (with use of model). Discussed causes of constipation and strategies for mitigating symptoms including increased fiber, water, and exercise. Discussed use of squatty potty to optimize PFM relaxation and promote bowel emptying without straining. Discussed use of abdominal massage to improve stool transit time.   Reviewed bowel/fiber diary, pt ed on findings and how they may be impacting symptoms  Pt ed on impact of fiber on stool consistency and bowel regularity  Reviewed home program   Neuro Re-Educ Minutes 10    Manual Therapy Minutes  20   Therapeutic Activity Minutes 15 25   Evaluation Minutes 20    Total Time Of Timed Procedures 25 45   Total Time Of Service-Based Procedures 20 0   Total Treatment Time 45 45   HEP Access Code: [email or web address]  URL: [email or web address]  Date: 06/11/2025  Prepared by: Abby Salazar Notes  breathing sequence: prone breathing x10, supine belly breathing x10, supine lateral rib breathing x10, sidelying breathing arm overhead x10    Exercises  - Prone Diaphragmatic Breathing  - 1 x daily - 7 x weekly - 1 sets - 10 reps  - Supine Diaphragmatic Breathing  - 1 x daily - 7 x weekly - 1 sets - 10 reps  - Sidelying Diaphragmatic Breathing  - 1 x daily - 7 x weekly - 1 sets - 10 reps    Patient Education  - Get To Know Your Pelvic Floor- Female  - Bowel Emptying Techniques  - Abdominal Massage for Constipation Continue, and add consistent dietary fiber 15-20g per day to start        HEP  Continue, and add consistent dietary fiber 15-20g per day to start    Charges  1 Man, 2 TA

## 2025-06-25 ENCOUNTER — OFFICE VISIT (OUTPATIENT)
Dept: PHYSICAL THERAPY | Age: 45
End: 2025-06-25
Attending: INTERNAL MEDICINE
Payer: COMMERCIAL

## 2025-06-25 PROCEDURE — 97140 MANUAL THERAPY 1/> REGIONS: CPT

## 2025-06-25 PROCEDURE — 97530 THERAPEUTIC ACTIVITIES: CPT

## 2025-06-25 NOTE — PROGRESS NOTES
Patient: Kadie Leyva (45 year old, female) Referring Provider:  Insurance:   Diagnosis: Chronic constipation (K59.09), Pelvic floor dysfunction (M62.89) BYRON Pace  BCBS OUT OF STATE   Date of Surgery: n/a Next MD visit:  N/A   Precautions:  None No data recorded Referral Information:    Date of Evaluation: Req: 0, Auth: 0, Exp:     06/11/25 POC Auth Visits:  10      Today's Date   6/25/2025    Subjective  Pt had multiple BMs on Thursday after last weeks appt, and then again today. Feels like increasing the fiber is helping.       Pain: 0/10     Objective  see flowsheet below       Pt has been at 15-18g fiber this past week, rec moving up to 20g     Assessment  Pt will benefit from slow consistent increased dietary fiber intake. Pt with audible bowel sounds during abdominal MFR. Continue manual therapies.    Goals (to be met in 10 visits)      Not Met Progress Toward Partially Met Met   Patient will report consistent grade 4 on BSS for improved bowel health and ease with stool evacuation. [] [x] [] []   Patient will report consumption of at least 25 grams of daily fiber and 64 ounces of water for improved stool consistency. [] [x] [] []   Patient will demonstrate optimal voiding mechanics and breath regulation with bowel movements for improved bowel emptying without straining. [] [x] [] []   Patient will report increased BM frequency to at least 1x every other day indicating improved stool motility and overall bowel health. [] [x] [] []   Patient will report adherence to HEP for continued exercise benefits following cessation of PT. [] [x] [] []           Plan  bowel/abdominal massage    Treatment Last 4 Visits  Treatment Day: 3       6/11/2025 6/18/2025 6/25/2025   Pelvic Treatment   Neuro Re-Education Worked on diaphragmatic breathing in various positions     Manual Therapy  Abdominal MFR  Bowel massage Abdominal MFR   Bowel massage      Therapeutic Activity Educated on pelvic floor and pelvic organ anatomy  and function (with use of model). Discussed causes of constipation and strategies for mitigating symptoms including increased fiber, water, and exercise. Discussed use of squatty potty to optimize PFM relaxation and promote bowel emptying without straining. Discussed use of abdominal massage to improve stool transit time.   Reviewed bowel/fiber diary, pt ed on findings and how they may be impacting symptoms  Pt ed on impact of fiber on stool consistency and bowel regularity  Reviewed home program Reviewed fiber/bowel diary     Neuro Re-Educ Minutes 10     Manual Therapy Minutes  20 30   Therapeutic Activity Minutes 15 25 8   Evaluation Minutes 20     Total Time Of Timed Procedures 25 45 38   Total Time Of Service-Based Procedures 20 0 0   Total Treatment Time 45 45 38   HEP Access Code: [email or web address]  URL: [email or web address]  Date: 06/11/2025  Prepared by: Abby Salazar Notes  breathing sequence: prone breathing x10, supine belly breathing x10, supine lateral rib breathing x10, sidelying breathing arm overhead x10    Exercises  - Prone Diaphragmatic Breathing  - 1 x daily - 7 x weekly - 1 sets - 10 reps  - Supine Diaphragmatic Breathing  - 1 x daily - 7 x weekly - 1 sets - 10 reps  - Sidelying Diaphragmatic Breathing  - 1 x daily - 7 x weekly - 1 sets - 10 reps    Patient Education  - Get To Know Your Pelvic Floor- Female  - Bowel Emptying Techniques  - Abdominal Massage for Constipation Continue, and add consistent dietary fiber 15-20g per day to start No changes        HEP  No changes    Charges  2 Man, 1 TA

## 2025-07-02 ENCOUNTER — OFFICE VISIT (OUTPATIENT)
Dept: PHYSICAL THERAPY | Age: 45
End: 2025-07-02
Attending: INTERNAL MEDICINE
Payer: COMMERCIAL

## 2025-07-02 PROCEDURE — 97140 MANUAL THERAPY 1/> REGIONS: CPT

## 2025-07-02 PROCEDURE — 97530 THERAPEUTIC ACTIVITIES: CPT

## 2025-07-03 NOTE — PROGRESS NOTES
Patient: Kadie Leyva (45 year old, female) Referring Provider:  Insurance:   Diagnosis: Chronic constipation (K59.09), Pelvic floor dysfunction (M62.89) BYRON Pace  BCBS OUT OF STATE   Date of Surgery: n/a Next MD visit:  N/A   Precautions:  None No data recorded Referral Information:    Date of Evaluation: Req: 0, Auth: 0, Exp:     06/11/25 POC Auth Visits:  10        Today's Date   7/2/2025    Subjective  Pt tracking daily fiber over the past week. Has been able to have daily BM this past week on about 20g of fiber per evelio, except for one day she was low fiber and didn't have a BM that day. No lower abdominal pain.       Pain: 0/10     Objective  see flowsheet below         Assessment  Pt with significant progress this past week with dietary fiber increase to 20g/day. Pt will benefit from slow consistent increased dietary fiber intake, trial 25g/day this week as part of home program. Trialed dynamic cupping MFR this session, pt with audible bowel sounds.     Goals (to be met in 10 visits)      Not Met Progress Toward Partially Met Met   Patient will report consistent grade 4 on BSS for improved bowel health and ease with stool evacuation. [] [x] [] []   Patient will report consumption of at least 25 grams of daily fiber and 64 ounces of water for improved stool consistency. [] [x] [] []   Patient will demonstrate optimal voiding mechanics and breath regulation with bowel movements for improved bowel emptying without straining. [] [x] [] []   Patient will report increased BM frequency to at least 1x every other day indicating improved stool motility and overall bowel health. [] [x] [] []   Patient will report adherence to HEP for continued exercise benefits following cessation of PT. [] [x] [] []               Plan  bowel/abdominal massage, cupping    Treatment Last 4 Visits  Treatment Day: 4       6/11/2025 6/18/2025 6/25/2025 7/2/2025   Pelvic Treatment   Neuro Re-Education Worked on diaphragmatic  breathing in various positions      Manual Therapy  Abdominal MFR  Bowel massage Abdominal MFR   Bowel massage    Abdominal dynamic cupping MFR techniques  Bowel massage      Therapeutic Activity Educated on pelvic floor and pelvic organ anatomy and function (with use of model). Discussed causes of constipation and strategies for mitigating symptoms including increased fiber, water, and exercise. Discussed use of squatty potty to optimize PFM relaxation and promote bowel emptying without straining. Discussed use of abdominal massage to improve stool transit time.   Reviewed bowel/fiber diary, pt ed on findings and how they may be impacting symptoms  Pt ed on impact of fiber on stool consistency and bowel regularity  Reviewed home program Reviewed fiber/bowel diary   Reviewed fiber/bowel diary   Neuro Re-Educ Minutes 10      Manual Therapy Minutes  20 30 25   Therapeutic Activity Minutes 15 25 8 13   Evaluation Minutes 20      Total Time Of Timed Procedures 25 45 38 38   Total Time Of Service-Based Procedures 20 0 0 0   Total Treatment Time 45 45 38 38   HEP Access Code: [email or web address]  URL: [email or web address]  Date: 06/11/2025  Prepared by: Abby Anna    Program Notes  breathing sequence: prone breathing x10, supine belly breathing x10, supine lateral rib breathing x10, sidelying breathing arm overhead x10    Exercises  - Prone Diaphragmatic Breathing  - 1 x daily - 7 x weekly - 1 sets - 10 reps  - Supine Diaphragmatic Breathing  - 1 x daily - 7 x weekly - 1 sets - 10 reps  - Sidelying Diaphragmatic Breathing  - 1 x daily - 7 x weekly - 1 sets - 10 reps    Patient Education  - Get To Know Your Pelvic Floor- Female  - Bowel Emptying Techniques  - Abdominal Massage for Constipation Continue, and add consistent dietary fiber 15-20g per day to start No changes         HEP  No changes    Charges  1 TA, 2 Man

## 2025-07-09 ENCOUNTER — OFFICE VISIT (OUTPATIENT)
Dept: PHYSICAL THERAPY | Age: 45
End: 2025-07-09
Attending: INTERNAL MEDICINE
Payer: COMMERCIAL

## 2025-07-09 PROCEDURE — 97530 THERAPEUTIC ACTIVITIES: CPT

## 2025-07-09 PROCEDURE — 97140 MANUAL THERAPY 1/> REGIONS: CPT

## 2025-07-11 NOTE — PROGRESS NOTES
Patient: Kadie Leyva (45 year old, female) Referring Provider:  Insurance:   Diagnosis: Chronic constipation (K59.09), Pelvic floor dysfunction (M62.89) BYRON Pace  BCBS OUT OF STATE   Date of Surgery: n/a Next MD visit:  N/A   Precautions:  None No data recorded Referral Information:    Date of Evaluation: Req: 0, Auth: 0, Exp:     06/11/25 POC Auth Visits:  10        Today's Date   7/9/2025    Subjective  Pt went up to avg of 25g of fiber this week, only had 1 normal BM on saturday, otherwise they have been small, and have needed to be pushing more during.       Pain: 0/10     Objective  see flowsheet below         Assessment  PT may benefit from bringing fiber back down to 20g per day, as this week with increased fiber BM regularity amd motility slowed compared to the previous day. Continue with bowel massage and abdominal MFR.    Goals (to be met in 10 visits)      Not Met Progress Toward Partially Met Met   Patient will report consistent grade 4 on BSS for improved bowel health and ease with stool evacuation. [] [x] [] []   Patient will report consumption of at least 25 grams of daily fiber and 64 ounces of water for improved stool consistency. [] [x] [] []   Patient will demonstrate optimal voiding mechanics and breath regulation with bowel movements for improved bowel emptying without straining. [] [x] [] []   Patient will report increased BM frequency to at least 1x every other day indicating improved stool motility and overall bowel health. [] [x] [] []   Patient will report adherence to HEP for continued exercise benefits following cessation of PT. [] [x] [] []                   Plan  bowel/abdominal massage, cupping    Treatment Last 4 Visits  Treatment Day: 5       6/18/2025 6/25/2025 7/2/2025 7/9/2025   Pelvic Treatment   Manual Therapy Abdominal MFR  Bowel massage Abdominal MFR   Bowel massage    Abdominal dynamic cupping MFR techniques  Bowel massage    Abdominal dynamic cupping MFR techniques    Bowel massage      Therapeutic Activity Reviewed bowel/fiber diary, pt ed on findings and how they may be impacting symptoms  Pt ed on impact of fiber on stool consistency and bowel regularity  Reviewed home program Reviewed fiber/bowel diary   Reviewed fiber/bowel diary Reviewed fiber/bowel diary   Manual Therapy Minutes 20 30 25 25   Therapeutic Activity Minutes 25 8 13 15   Total Time Of Timed Procedures 45 38 38 40   Total Time Of Service-Based Procedures 0 0 0 0   Total Treatment Time 45 38 38 40   HEP Continue, and add consistent dietary fiber 15-20g per day to start No changes          HEP  No changes    Charges  2 Man, 1 TA

## 2025-07-16 ENCOUNTER — OFFICE VISIT (OUTPATIENT)
Dept: PHYSICAL THERAPY | Age: 45
End: 2025-07-16
Attending: INTERNAL MEDICINE
Payer: COMMERCIAL

## 2025-07-16 PROCEDURE — 97530 THERAPEUTIC ACTIVITIES: CPT

## 2025-07-16 PROCEDURE — 97140 MANUAL THERAPY 1/> REGIONS: CPT

## 2025-07-16 NOTE — PROGRESS NOTES
Discharge Summary  Pt has attended 6 visits in Physical Therapy.     Patient: Kadie Leyva (45 year old, female) Referring Provider:  Insurance:   Diagnosis: Chronic constipation (K59.09), Pelvic floor dysfunction (M62.89) BYRON Pace  BCBS OUT OF STATE   Date of Surgery: n/a Next MD visit:  N/A   Precautions:  None No data recorded Referral Information:    Date of Evaluation: Req: 0, Auth: 0, Exp:     06/11/25 POC Auth Visits:  10      Today's Date   7/16/2025    Subjective  Pt had 1 regular BM last Thursday and then now has not been able to expel fully since then. Feels like the bowel massage may help.       Pain: -- (pain not quantified, pt states \"uncomfortable\")     Objective  see flowsheet below       Assessment  Pt not making further progress toward symptom resolution at this time. Pt is cycling back to constipation, from 2 weeks ago where pt was having daily BMs, while maintaining consistency with home program. Increased fiber and bowel massage both have provided temporary improvement in symptoms during the course of care, but the changes have not been lasting. Per pt report, this tends to be the pattern with medications she has tried in the past. Pt had close to 2 weeks with regular BMs with improved evacuation, with home program. Pt may benefit from continued trial with increased fiber over the next few months. Rec pt follow up with referring provider regarding next steps. Pt may benefit from pursing PT with a therapist trained in visceral mobilizations, pt educated on recomendation. Pt may also benefit from regular abdominal/bowel massage from a skilled massage therapist, as this seems to be able to provide temporary benefit. Pt verbalized understanding that if she decides to pursue internal PFM assessment in the future, she can discuss with physician and request another referral if deemed appropriate. Pt appropriate for and agreeable to discharge at this time.     Goals (to be met in 10 visits)       Not Met Progress Toward Partially Met Met   Patient will report consistent grade 4 on BSS for improved bowel health and ease with stool evacuation. [] [x] [] []   Patient will report consumption of at least 25 grams of daily fiber and 64 ounces of water for improved stool consistency. [] [x] [] []   Patient will demonstrate optimal voiding mechanics and breath regulation with bowel movements for improved bowel emptying without straining. [] [x] [] []   Patient will report increased BM frequency to at least 1x every other day indicating improved stool motility and overall bowel health. [] [x] [] []   Patient will report adherence to HEP for continued exercise benefits following cessation of PT. [] [] [] [x]         Plan  discharge    Treatment Last 4 Visits  Treatment Day: 6 6/25/2025 7/2/2025 7/9/2025 7/16/2025   Pelvic Treatment   Manual Therapy Abdominal MFR   Bowel massage    Abdominal dynamic cupping MFR techniques  Bowel massage    Abdominal dynamic cupping MFR techniques   Bowel massage    Abdominal MFR  Bowel massage   Therapeutic Activity Reviewed fiber/bowel diary   Reviewed fiber/bowel diary Reviewed fiber/bowel diary Reviewed fiber/bowel diary  Reviewed progress toward goals, PT outcomes and rec for follow up with ref provider  Pt ed on rec for continuing with fiber/water/bowel diary for up to 3 months for tracking symptoms   Manual Therapy Minutes 30 25 25 23   Therapeutic Activity Minutes 8 13 15 25   Total Time Of Timed Procedures 38 38 40 48   Total Time Of Service-Based Procedures 0 0 0 0   Total Treatment Time 38 38 40 48   HEP No changes           HEP  No changes    Charges  2 Man, 2 TA         Plan: Discharge with HEP. Patient was instructed to follow up with their physician should an exacerbation of symptoms arise.     Thank you for your referral. If you have any questions, please contact me at Dept: [phone].    Sincerely,  Electronically signed by therapist: Abby Anna, PT      Physician's certification required:  No

## 2025-07-23 ENCOUNTER — APPOINTMENT (OUTPATIENT)
Dept: PHYSICAL THERAPY | Age: 45
End: 2025-07-23
Attending: INTERNAL MEDICINE
Payer: COMMERCIAL

## 2025-07-30 ENCOUNTER — APPOINTMENT (OUTPATIENT)
Dept: PHYSICAL THERAPY | Age: 45
End: 2025-07-30
Attending: INTERNAL MEDICINE
Payer: COMMERCIAL

## 2025-08-06 ENCOUNTER — APPOINTMENT (OUTPATIENT)
Dept: PHYSICAL THERAPY | Age: 45
End: 2025-08-06
Attending: INTERNAL MEDICINE

## 2025-08-11 ENCOUNTER — APPOINTMENT (OUTPATIENT)
Age: 45
End: 2025-08-11

## 2025-08-11 DIAGNOSIS — D25.9 UTERINE LEIOMYOMA, UNSPECIFIED LOCATION: ICD-10-CM

## 2025-08-11 PROCEDURE — 76830 TRANSVAGINAL US NON-OB: CPT | Performed by: RADIOLOGY

## 2025-08-11 PROCEDURE — 76856 US EXAM PELVIC COMPLETE: CPT | Performed by: RADIOLOGY

## 2025-08-13 ENCOUNTER — APPOINTMENT (OUTPATIENT)
Dept: PHYSICAL THERAPY | Age: 45
End: 2025-08-13
Attending: INTERNAL MEDICINE

## 2025-08-21 ENCOUNTER — APPOINTMENT (OUTPATIENT)
Dept: PHYSICAL THERAPY | Age: 45
End: 2025-08-21
Attending: INTERNAL MEDICINE

## (undated) DEVICE — MEDI-VAC NON-CONDUCTIVE SUCTION TUBING 6MM X 1.8M (6FT.) L: Brand: CARDINAL HEALTH

## (undated) DEVICE — 60 ML SYRINGE REGULAR TIP: Brand: MONOJECT

## (undated) DEVICE — Device

## (undated) DEVICE — KIT ENDO ORCAPOD 160/180/190

## (undated) DEVICE — V2 SPECIMEN COLLECTION MANIFOLD KIT: Brand: NEPTUNE

## (undated) DEVICE — V2 SPECIMEN COLLECTION TRAY: Brand: NEPTUNE

## (undated) DEVICE — LASSO POLYPECTOMY SNARE: Brand: LASSO

## (undated) DEVICE — KIT CLEAN ENDOKIT 1.1OZ GOWNX2

## (undated) NOTE — LETTER
[facility]  Administration of Anesthesia  SUNIL Kadie LAURA Gordon agree to be cared for by a physician anesthesiologist alone and/or with a nurse anesthetist, who is specially trained to monitor me and give me medicine to put me to sleep or keep me comfortable during my procedure    I understand that my anesthesiologist and/or anesthetist is not an employee or agent of [facility] or [employer]. He or she works for [facility]    As the patient asking for anesthesia services, I agree to:  Allow the anesthesiologist (anesthesia doctor) to give me medicine and do additional procedures as necessary. Some examples are: Starting or using an “IV” to give me medicine, fluids or blood during my procedure, and having a breathing tube placed to help me breathe when I’m asleep (intubation). In the event that my heart stops working properly, I understand that my anesthesiologist will make every effort to sustain my life, unless otherwise directed by [facility] Do Not Resuscitate documents.  Tell my anesthesia doctor before my procedure:  If I am pregnant.  The last time that I ate or drank.  iii. All of the medicines I take (including prescriptions, herbal supplements, and pills I can buy without a prescription (including street drugs/illegal medications). Failure to inform my anesthesiologist about these medicines may increase my risk of anesthetic complications.  iv.If I am allergic to anything or have had a reaction to anesthesia before.  I understand how the anesthesia medicine will help me (benefits).  I understand that with any type of anesthesia medicine there are risks:  The most common risks are: nausea, vomiting, sore throat, muscle soreness, damage to my eyes, mouth, or teeth (from breathing tube placement).  Rare risks include: remembering what happened during my procedure, allergic reactions to medications, injury to my airway, heart, lungs, vision, nerves, or  muscles and in extremely rare instances death.  My doctor has explained to me other choices available to me for my care (alternatives).  Pregnant Patients (“epidural”):  I understand that the risks of having an epidural (medicine given into my back to help control pain during labor), include itching, low blood pressure, difficulty urinating, headache or slowing of the baby’s heart. Very rare risks include infection, bleeding, seizure, irregular heart rhythms and nerve injury.  Regional Anesthesia (“spinal”, “epidural”, & “nerve blocks”):  I understand that rare but potential complications include headache, bleeding, infection, seizure, irregular heart rhythms, and nerve injury.    _____________________________________________________________________________  Patient (or Representative) Signature/Relationship to Patient  Date   Time    _____________________________________________________________________________   Name (if used)    Language/Organization   Time    _____________________________________________________________________________  Nurse Anesthetist Signature     Date   Time  _____________________________________________________________________________  Anesthesiologist Signature     Date   Time  I have discussed the procedure and information above with the patient (or patient’s representative) and answered their questions. The patient or their representative has agreed to have anesthesia services.    _____________________________________________________________________________  Witness        Date   Time  I have verified that the signature is that of the patient or patient’s representative, and that it was signed before the procedure  Patient Name: Kadie Leyva     : 2/15/1980                 Printed: 2025 at 11:35 AM    Medical Record #: [id number]                                            Page 1 of 1  ----------ANESTHESIA CONSENT----------

## (undated) NOTE — LETTER
Patient Name: Kadie Leyva  YOB: 1980          MRN :  [id number]  Date:  7/16/2025  Referring Physician:  BYRON Pace    Discharge Summary  Pt has attended 6 visits in Physical Therapy.     Patient: Kadie Leyva (45 year old, female) Referring Provider:  Insurance:   Diagnosis: Chronic constipation (K59.09), Pelvic floor dysfunction (M62.89) BYRON Pace  BCBS OUT OF STATE   Date of Surgery: n/a Next MD visit:  N/A   Precautions:  None No data recorded Referral Information:    Date of Evaluation: Req: 0, Auth: 0, Exp:     06/11/25 POC Auth Visits:  10      Today's Date   7/16/2025    Subjective  Pt had 1 regular BM last Thursday and then now has not been able to expel fully since then. Feels like the bowel massage may help.       Pain: -- (pain not quantified, pt states \"uncomfortable\")     Objective  see flowsheet below               21st Century Cures Act Notice to Patient: Medical documents like this are made available to patients in the interest of transparency. However, be advised this is a medical document and it is intended as [id number] communication between your medical providers. This medical document may contain abbreviations, assessments, medical data, and results or other terms that are unfamiliar. Medical documents are intended to carry relevant information, facts as evident, and the clinical opinion of the practitioner. As such, this medical document may be written in language that appears blunt or direct. You are encouraged to contact your medical provider and/or [facility] if you have any questions about this medical document.

## (undated) NOTE — LETTER
[facility]  [address], [place], [place]       INFORMED REFUSAL FOR TREATMENT / PROCEDURE / TESTING      I have been advised by . _____________Lynch_________________ that it is necessary for me to undergo the following treatment, procedure or testing.  The treatment, procedure or test is as follows:    _____________________________________________________________________    Urine Pregnancy Test  _____________________________________________________________________    The need for this treatment, procedures and/or testing, its benefits and risks, and alternatives has been explained to me by my physician.  I understand that my refusal to consent to the recommended medical treatment or testing may seriously impair my health and even jeopardize my life.  While I understand the possible consequences, I nevertheless refuse to submit to the recommended treatment, procedure or testing against medical advice.  I assume responsibility for the consequences of this refusal and release [facility], [facility] ([facility]), it affiliates and subsidiaries, its employees and agents, and the physician, from any and all liability associated with my refusal to follow the medical advice and permit treatment, procedure or testing.          ________________________________________  (Patient Signature)      Kadie L Leyva  (Patient Name, Printed)    ________________________________________  (Date)                  Patient Name: Kadie Leyva     : 2/15/1980                 Printed: 2025      Medical Record #: [id number]                                              Page 1 of 1

## (undated) NOTE — LETTER
[facility]  [address], [place], [place]    Authorization for Surgical Operation and Procedure                               I hereby authorize Sylvester Portillo MD, my physician and his/her assistants (if applicable), which may include medical students, residents, and/or fellows, to perform the following surgical operation/ procedure and administer such anesthesia as may be determined necessary by my physician: Operation/Procedure name (s) COLONOSCOPY on Kadie Leyva   2.   I recognize that during the surgical operation/procedure, unforeseen conditions may necessitate additional or different procedures than those listed above.  I, therefore, further authorize and request that the above-named surgeon, assistants, or designees perform such procedures as are, in their judgment, necessary and desirable.    3.   My surgeon/physician has discussed prior to my surgery the potential benefits, risks and side effects of this procedure; the likelihood of achieving goals; and potential problems that might occur during recuperation.  They also discussed reasonable alternatives to the procedure, including risks, benefits, and side effects related to the alternatives and risks related to not receiving this procedure.  I have had all my questions answered and I acknowledge that no guarantee has been made as to the result that may be obtained.    4.   Should the need arise during my operation/procedure, which includes change of level of care prior to discharge, I also consent to the administration of blood and/or blood products.  Further, I understand that despite careful testing and screening of blood or blood products by collecting agencies, I may still be subject to ill effects as a result of receiving a blood transfusion and/or blood products.  The following are some, but not all, of the potential risks that can occur: fever and allergic reactions, hemolytic reactions, transmission of diseases such as  Hepatitis, AIDS and Cytomegalovirus (CMV) and fluid overload.  In the event that I wish to have an autologous transfusion of my own blood, or a directed donor transfusion, I will discuss this with my physician.  Check only if Refusing Blood or Blood Products  I understand refusal of blood or blood products as deemed necessary by my physician may have serious consequences to my condition to include possible death. I hereby assume responsibility for my refusal and release the hospital, its personnel, and my physicians from any responsibility for the consequences of my refusal.    o  Refuse   5.   I authorize the use of any specimen, organs, tissues, body parts or foreign objects that may be removed from my body during the operation/procedure for diagnosis, research or teaching purposes and their subsequent disposal by hospital authorities.  I also authorize the release of specimen test results and/or written reports to my treating physician on the hospital medical staff or other referring or consulting physicians involved in my care, at the discretion of the Pathologist or my treating physician.    6.   I consent to the photographing or videotaping of the operations or procedures to be performed, including appropriate portions of my body for medical, scientific, or educational purposes, provided my identity is not revealed by the pictures or by descriptive texts accompanying them.  If the procedure has been photographed/videotaped, the surgeon will obtain the original picture, image, videotape or CD.  The hospital will not be responsible for storage, release or maintenance of the picture, image, tape or CD.    7.   I consent to the presence of a  or observers in the operating room as deemed necessary by my physician or their designees.    8.   I recognize that in the event my procedure results in extended X-Ray/fluoroscopy time, I may develop a skin reaction.    9. If I have a Do Not Attempt  Resuscitation (DNAR) order in place, that status will be suspended while in the operating room, procedural suite, and during the recovery period unless otherwise explicitly stated by me (or a person authorized to consent on my behalf). The surgeon or my attending physician will determine when the applicable recovery period ends for purposes of reinstating the DNAR order.  10. Patients having a sterilization procedure: I understand that if the procedure is successful the results will be permanent and it will therefore be impossible for me to inseminate, conceive, or bear children.  I also understand that the procedure is intended to result in sterility, although the result has not been guaranteed.   11. I acknowledge that my physician has explained sedation/analgesia administration to me including the risk and benefits I consent to the administration of sedation/analgesia as may be necessary or desirable in the judgment of my physician.    I CERTIFY THAT I HAVE READ AND FULLY UNDERSTAND THE ABOVE CONSENT TO OPERATION and/or OTHER PROCEDURE.     ____________________________________  _________________________________        ______________________________  Signature of Patient    Signature of Responsible Person                Printed Name of Responsible Person                                      ____________________________________  _____________________________                ________________________________  Signature of Witness        Date  Time         Relationship to Patient    STATEMENT OF PHYSICIAN My signature below affirms that prior to the time of the procedure; I have explained to the patient and/or his/her legal representative, the risks and benefits involved in the proposed treatment and any reasonable alternative to the proposed treatment. I have also explained the risks and benefits involved in refusal of the proposed treatment and alternatives to the proposed treatment and have answered the patient's  questions. If I have a significant financial interest in a co-management agreement or a significant financial interest in any product or implant, or other significant relationship used in this procedure/surgery, I have disclosed this and had a discussion with my patient.     _____________________________________________________              _____________________________  (Signature of Physician)                                                                                         (Date)                                   (Time)  Patient Name: Kadie Leyva      : 2/15/1980      Printed: 2025     Medical Record #: [id number]                                      Page 1 of 1